# Patient Record
Sex: MALE | Race: WHITE | NOT HISPANIC OR LATINO | Employment: FULL TIME | URBAN - METROPOLITAN AREA
[De-identification: names, ages, dates, MRNs, and addresses within clinical notes are randomized per-mention and may not be internally consistent; named-entity substitution may affect disease eponyms.]

---

## 2018-11-16 ENCOUNTER — HOSPITAL ENCOUNTER (EMERGENCY)
Facility: HOSPITAL | Age: 28
Discharge: LEFT BEFORE DISCHARGE | End: 2018-11-17
Attending: EMERGENCY MEDICINE | Admitting: EMERGENCY MEDICINE

## 2018-11-16 VITALS
RESPIRATION RATE: 16 BRPM | HEIGHT: 68 IN | HEART RATE: 80 BPM | BODY MASS INDEX: 19.7 KG/M2 | DIASTOLIC BLOOD PRESSURE: 89 MMHG | TEMPERATURE: 97.5 F | WEIGHT: 130 LBS | SYSTOLIC BLOOD PRESSURE: 134 MMHG | OXYGEN SATURATION: 100 %

## 2018-11-16 DIAGNOSIS — F10.920 ACUTE ALCOHOLIC INTOXICATION WITHOUT COMPLICATION (HCC): Primary | ICD-10-CM

## 2018-11-16 DIAGNOSIS — S80.211A ABRASION OF RIGHT KNEE, INITIAL ENCOUNTER: ICD-10-CM

## 2018-11-16 LAB
ETHANOL BLD-MCNC: 434 MG/DL (ref 0–10)
ETHANOL UR QL: 0.43 %

## 2018-11-16 PROCEDURE — 80307 DRUG TEST PRSMV CHEM ANLYZR: CPT | Performed by: PHYSICIAN ASSISTANT

## 2018-11-16 PROCEDURE — 99283 EMERGENCY DEPT VISIT LOW MDM: CPT

## 2018-11-17 NOTE — ED PROVIDER NOTES
EMERGENCY DEPARTMENT ENCOUNTER    CHIEF COMPLAINT  Chief Complaint: EtOH intoxication  History given by: patient  History limited by: none  Room Number: 22/22  PMD: Provider, No Known      HPI:  Pt is a 28 y.o. male who presents from EMS complaining of EtOH intoxication that occurred PTA. Pt states that he left work and went to the [facility] and had about 10 shots of bourbon. Pt states he began to walk himself home and fell over. Pt does complain of right knee pain following the fall. Pt states EMS came after he fell. Per EMS, pt was given Thiamine and Zofran. Pt is unaware of his tdap status and denies any other drug use at this time.     Duration: Just PTA  Onset: gradual  Timing: constant  Location: NA  Radiation: NA  Quality: NA  Intensity/Severity: moderate  Progression: unchanged  Associated Symptoms: right knee pain  Aggravating Factors: none  Alleviating Factors: none  Previous Episodes: Pt has hx of EtOH intoxication.   Treatment before arrival: EMS gave pt Thiamine and Zofran PTA.     PAST MEDICAL HISTORY  Active Ambulatory Problems     Diagnosis Date Noted   • No Active Ambulatory Problems     Resolved Ambulatory Problems     Diagnosis Date Noted   • No Resolved Ambulatory Problems     No Additional Past Medical History       PAST SURGICAL HISTORY  History reviewed. No pertinent surgical history.    FAMILY HISTORY  History reviewed. No pertinent family history.    SOCIAL HISTORY  Social History     Socioeconomic History   • Marital status:      Spouse name: Not on file   • Number of children: Not on file   • Years of education: Not on file   • Highest education level: Not on file   Social Needs   • Financial resource strain: Not on file   • Food insecurity - worry: Not on file   • Food insecurity - inability: Not on file   • Transportation needs - medical: Not on file   • Transportation needs - non-medical: Not on file   Occupational History   • Not on file   Tobacco Use   • Smoking status:  Current Every Day Smoker   • Smokeless tobacco: Never Used   Substance and Sexual Activity   • Alcohol use: Yes   • Drug use: No   • Sexual activity: Defer   Other Topics Concern   • Not on file   Social History Narrative   • Not on file       ALLERGIES  Patient has no known allergies.    REVIEW OF SYSTEMS  Review of Systems   Constitutional: Negative for activity change, appetite change and fever.        (+) EtOH intoxication   HENT: Negative for congestion and sore throat.    Eyes: Negative.    Respiratory: Negative for cough and shortness of breath.    Cardiovascular: Negative for chest pain and leg swelling.   Gastrointestinal: Negative for abdominal pain, diarrhea and vomiting.   Endocrine: Negative.    Genitourinary: Negative for decreased urine volume and dysuria.   Musculoskeletal: Positive for arthralgias (right knee). Negative for neck pain.   Skin: Negative for rash and wound.   Allergic/Immunologic: Negative.    Neurological: Negative for weakness, numbness and headaches.   Hematological: Negative.    Psychiatric/Behavioral: Negative.    All other systems reviewed and are negative.      PHYSICAL EXAM  ED Triage Vitals [11/16/18 2139]   Temp Heart Rate Resp BP SpO2   97.5 °F (36.4 °C) 80 16 134/89 100 %      Temp src Heart Rate Source Patient Position BP Location FiO2 (%)   Tympanic Monitor -- -- --       Physical Exam   Constitutional: He is oriented to person, place, and time. No distress.   Pt smells of alcohol and appears to be intoxicated.    HENT:   Head: Normocephalic and atraumatic.   Eyes: EOM are normal. Pupils are equal, round, and reactive to light.   Neck: Normal range of motion. Neck supple.   Cardiovascular: Normal rate, regular rhythm and normal heart sounds.   Pulmonary/Chest: Effort normal and breath sounds normal. No respiratory distress.   Abdominal: Soft. There is no tenderness. There is no rebound and no guarding.   Musculoskeletal: Normal range of motion. He exhibits no edema.    Neurological: He is alert and oriented to person, place, and time. He has normal sensation and normal strength.   Skin: Skin is warm and dry. Abrasion (right knee) noted.   Psychiatric: Mood and affect normal.   Nursing note and vitals reviewed.      LAB RESULTS  Lab Results (last 24 hours)     Procedure Component Value Units Date/Time    Ethanol [[id number]]  (Abnormal) Collected:  11/16/18 2210    Specimen:  Blood Updated:  11/16/18 2238     Ethanol 434 mg/dL      Ethanol % 0.434 %           I ordered the above labs and reviewed the results      PROCEDURES  Procedures      PROGRESS AND CONSULTS     2205-Ordered Ethanol for further evaluation and tdap for infection.     2300-Pt is ambulating successfully without assistance.     2341-Discussed pt case with  (ED Physician) who agrees with the plan of care.     0002-Pt is in NAD and comfortably sleeping.     0123-Patient was seen by a healthcare provider and left AMA. Pt was counseled on risks of leaving intoxicated and verbalized understanding of risks.       MEDICAL DECISION MAKING  Results were reviewed/discussed with the patient and they were also made aware of online access. Pt also made aware that some labs, such as cultures, will not be resulted during ER visit and follow up with PMD is necessary.     MDM       DIAGNOSIS  Final diagnoses:   Acute alcoholic intoxication without complication (CMS/HCC)   Abrasion of right knee, initial encounter       DISPOSITION  Pt left AMA    Latest Documented Vital Signs:  As of 2:54 AM  BP- 134/89 HR- 80 Temp- 97.5 °F (36.4 °C) (Tympanic) O2 sat- 100%    --  Documentation assistance provided by ambreen Villanueva for Elbert Ramey (PA).  Information recorded by the scribe was done at my direction and has been verified and validated by me.               Karley Villanueva  11/17/18 0223       Elbert Ramey PA  11/17/18 0255

## 2018-11-17 NOTE — ED NOTES
Patient states that he accidentally pulled out his IV. When I entered the room patient was bleeding from his right ac, patient was resting comfortably and site was cleaned and taped. Patient vital signs are stable.          Risa Araya  11/16/18 5335

## 2018-11-17 NOTE — ED TRIAGE NOTES
To ER via EMS.  Pt was found intoxicated on side of the road.  Pt states he has been drinking beer.  Thiamine and Zofran given PTA.

## 2018-11-17 NOTE — ED NOTES
Pt reports that his glasses are missing as well as his ID, and bank card.  [facility] returned call and reported that he did not have his glasses and he had his ID in his lap that he put in his wallet.     Ana Koroma RN  11/16/18 5109       Ana Koroma RN  11/16/18 7041

## 2018-11-17 NOTE — ED NOTES
Patient walked out the front door of ER.  [facility] was called to alert them that the patient had left and was intoxicated.     Ana Koroma RN  11/17/18 0131

## 2018-11-17 NOTE — ED NOTES
"Patient states that he was drinking bourbon at the [place] tonight. Was walking home and he fell over while walking home. Ambulance was called. Ambulance gave thiamine and a liter of 1000 normal saline.      Urszula Yoo, RN  11/16/18 2532    "

## 2021-10-14 ENCOUNTER — APPOINTMENT (OUTPATIENT)
Dept: GENERAL RADIOLOGY | Facility: HOSPITAL | Age: 31
End: 2021-10-14

## 2021-10-14 ENCOUNTER — HOSPITAL ENCOUNTER (EMERGENCY)
Facility: HOSPITAL | Age: 31
Discharge: HOME | End: 2021-10-15
Attending: EMERGENCY MEDICINE | Admitting: EMERGENCY MEDICINE

## 2021-10-14 DIAGNOSIS — F41.9 ANXIETY: Primary | ICD-10-CM

## 2021-10-14 DIAGNOSIS — F10.10 ALCOHOL ABUSE: ICD-10-CM

## 2021-10-14 LAB
ALBUMIN SERPL-MCNC: 4.9 G/DL (ref 3.5–5.2)
ALBUMIN/GLOB SERPL: 1.8 G/DL
ALP SERPL-CCNC: 79 U/L (ref 39–117)
ALT SERPL W P-5'-P-CCNC: 55 U/L (ref 1–41)
AMPHET+METHAMPHET UR QL: NEGATIVE
ANION GAP SERPL CALCULATED.3IONS-SCNC: 16.2 MMOL/L (ref 5–15)
AST SERPL-CCNC: 124 U/L (ref 1–40)
BARBITURATES UR QL SCN: NEGATIVE
BASOPHILS # BLD AUTO: 0.05 10*3/MM3 (ref 0–0.2)
BASOPHILS NFR BLD AUTO: 0.6 % (ref 0–1.5)
BENZODIAZ UR QL SCN: NEGATIVE
BILIRUB SERPL-MCNC: 1.1 MG/DL (ref 0–1.2)
BUN SERPL-MCNC: 10 MG/DL (ref 6–20)
BUN/CREAT SERPL: 14.5 (ref 7–25)
CALCIUM SPEC-SCNC: 10.1 MG/DL (ref 8.6–10.5)
CANNABINOIDS SERPL QL: NEGATIVE
CHLORIDE SERPL-SCNC: 97 MMOL/L (ref 98–107)
CO2 SERPL-SCNC: 23.8 MMOL/L (ref 22–29)
COCAINE UR QL: NEGATIVE
CREAT SERPL-MCNC: 0.69 MG/DL (ref 0.76–1.27)
DEPRECATED RDW RBC AUTO: 48.8 FL (ref 37–54)
EOSINOPHIL # BLD AUTO: 0.06 10*3/MM3 (ref 0–0.4)
EOSINOPHIL NFR BLD AUTO: 0.7 % (ref 0.3–6.2)
ERYTHROCYTE [DISTWIDTH] IN BLOOD BY AUTOMATED COUNT: 13.6 % (ref 12.3–15.4)
ETHANOL BLD-MCNC: <10 MG/DL (ref 0–10)
ETHANOL UR QL: <0.01 %
GFR SERPL CREATININE-BSD FRML MDRD: 133 ML/MIN/1.73
GLOBULIN UR ELPH-MCNC: 2.8 GM/DL
GLUCOSE SERPL-MCNC: 88 MG/DL (ref 65–99)
HCT VFR BLD AUTO: 42.7 % (ref 37.5–51)
HGB BLD-MCNC: 15.3 G/DL (ref 13–17.7)
IMM GRANULOCYTES # BLD AUTO: 0.05 10*3/MM3 (ref 0–0.05)
IMM GRANULOCYTES NFR BLD AUTO: 0.6 % (ref 0–0.5)
LYMPHOCYTES # BLD AUTO: 0.37 10*3/MM3 (ref 0.7–3.1)
LYMPHOCYTES NFR BLD AUTO: 4.1 % (ref 19.6–45.3)
MCH RBC QN AUTO: 35.1 PG (ref 26.6–33)
MCHC RBC AUTO-ENTMCNC: 35.8 G/DL (ref 31.5–35.7)
MCV RBC AUTO: 97.9 FL (ref 79–97)
METHADONE UR QL SCN: NEGATIVE
MONOCYTES # BLD AUTO: 0.51 10*3/MM3 (ref 0.1–0.9)
MONOCYTES NFR BLD AUTO: 5.6 % (ref 5–12)
NEUTROPHILS NFR BLD AUTO: 7.99 10*3/MM3 (ref 1.7–7)
NEUTROPHILS NFR BLD AUTO: 88.4 % (ref 42.7–76)
NRBC BLD AUTO-RTO: 0 /100 WBC (ref 0–0.2)
OPIATES UR QL: NEGATIVE
OXYCODONE UR QL SCN: NEGATIVE
PLATELET # BLD AUTO: 63 10*3/MM3 (ref 140–450)
PMV BLD AUTO: 9.2 FL (ref 6–12)
POTASSIUM SERPL-SCNC: 3.7 MMOL/L (ref 3.5–5.2)
PROT SERPL-MCNC: 7.7 G/DL (ref 6–8.5)
RBC # BLD AUTO: 4.36 10*6/MM3 (ref 4.14–5.8)
SODIUM SERPL-SCNC: 137 MMOL/L (ref 136–145)
TROPONIN T SERPL-MCNC: <0.01 NG/ML (ref 0–0.03)
TROPONIN T SERPL-MCNC: <0.01 NG/ML (ref 0–0.03)
WBC # BLD AUTO: 9.03 10*3/MM3 (ref 3.4–10.8)

## 2021-10-14 PROCEDURE — 85025 COMPLETE CBC W/AUTO DIFF WBC: CPT | Performed by: EMERGENCY MEDICINE

## 2021-10-14 PROCEDURE — 93010 ELECTROCARDIOGRAM REPORT: CPT | Performed by: INTERNAL MEDICINE

## 2021-10-14 PROCEDURE — 96376 TX/PRO/DX INJ SAME DRUG ADON: CPT

## 2021-10-14 PROCEDURE — 80307 DRUG TEST PRSMV CHEM ANLYZR: CPT | Performed by: EMERGENCY MEDICINE

## 2021-10-14 PROCEDURE — 71045 X-RAY EXAM CHEST 1 VIEW: CPT

## 2021-10-14 PROCEDURE — 99285 EMERGENCY DEPT VISIT HI MDM: CPT

## 2021-10-14 PROCEDURE — 82077 ASSAY SPEC XCP UR&BREATH IA: CPT | Performed by: EMERGENCY MEDICINE

## 2021-10-14 PROCEDURE — 84484 ASSAY OF TROPONIN QUANT: CPT | Performed by: EMERGENCY MEDICINE

## 2021-10-14 PROCEDURE — 93005 ELECTROCARDIOGRAM TRACING: CPT | Performed by: EMERGENCY MEDICINE

## 2021-10-14 PROCEDURE — 25010000002 LORAZEPAM PER 2 MG: Performed by: EMERGENCY MEDICINE

## 2021-10-14 PROCEDURE — 96374 THER/PROPH/DIAG INJ IV PUSH: CPT

## 2021-10-14 PROCEDURE — 80053 COMPREHEN METABOLIC PANEL: CPT | Performed by: EMERGENCY MEDICINE

## 2021-10-14 RX ORDER — LORAZEPAM 2 MG/ML
1 INJECTION INTRAMUSCULAR ONCE
Status: COMPLETED | OUTPATIENT
Start: 2021-10-14 | End: 2021-10-14

## 2021-10-14 RX ORDER — SODIUM CHLORIDE 0.9 % (FLUSH) 0.9 %
10 SYRINGE (ML) INJECTION AS NEEDED
Status: DISCONTINUED | OUTPATIENT
Start: 2021-10-14 | End: 2021-10-15 | Stop reason: HOSPADM

## 2021-10-14 RX ADMIN — LORAZEPAM 1 MG: 2 INJECTION INTRAMUSCULAR; INTRAVENOUS at 20:11

## 2021-10-14 RX ADMIN — SODIUM CHLORIDE 1000 ML: 9 INJECTION, SOLUTION INTRAVENOUS at 20:11

## 2021-10-14 RX ADMIN — LORAZEPAM 1 MG: 2 INJECTION INTRAMUSCULAR; INTRAVENOUS at 23:09

## 2021-10-14 NOTE — ED TRIAGE NOTES
"Pt comes to ER from work for anxiety and tremors. Pt called EMS and states he was having \"seizures from alcohol withdrawal\". Pt usually drinks approx 11 beers a day and states he has been \"cutting back\" and has about 12 beers from 2200 last night to approx 1200 today. EMS reports no seizure like activity with them. Pt and RN wearing mask upon triage.     "

## 2021-10-15 VITALS
DIASTOLIC BLOOD PRESSURE: 83 MMHG | TEMPERATURE: 98.4 F | BODY MASS INDEX: 19.7 KG/M2 | WEIGHT: 130 LBS | HEART RATE: 92 BPM | RESPIRATION RATE: 16 BRPM | HEIGHT: 68 IN | OXYGEN SATURATION: 94 % | SYSTOLIC BLOOD PRESSURE: 134 MMHG

## 2021-10-15 LAB — QT INTERVAL: 352 MS

## 2021-10-15 PROCEDURE — 90791 PSYCH DIAGNOSTIC EVALUATION: CPT

## 2021-10-15 NOTE — CONSULTS
"30 yo white male evaluated in ED (Room#20) accompanied by his disabled mother. Patient lives with his mother. Patient's mother states he takes care of her due to her inability \"to walk\". Patient was at work ('[employer] and coworkers called EMS due to patient's behavior. Patient states he had a panic attack and was dehydrated.     Patient is an alcoholic and last drink was yesterday morning per his report. States he drinks liquor and beer daily. Patient's CIWA 21 on arrival to ED. Ativan and IVF's given.       Patient appears oriented times 4. Alert. States his longest period of sobriety was 2 years \"when I replaced it with another addiction\". States he played Poker and \"was very good at it and won a lot of money\".     Patient has history of previous ED encounters at [facility] for intoxication and has been advised to stop drinking alcohol and referred to AA.     Patient states \"AA is not for me; I have mixed emotions about it\". States substance use disorder treatment is \"the blind leading the blind; stupid people trying to tell me what to do\". Patient is not motivated for [facility] treatment at this time. Patient and his mother both in denial about patient experiencing alcohol withdrawal. Patient states \"I have poweraid at home that I can drink\" when I talked about him being dehydrated.     Patient not interested in [facility] or [facility].     No SI. No history of self harm or suicide attempts. No HI. No psychosis.     Patient's mother on her cell phone and states \"i'm trying to get him insurance, you know passport\".     Patient declines [facility] referrals.         "

## 2021-10-15 NOTE — ED PROVIDER NOTES
EMERGENCY DEPARTMENT ENCOUNTER    Room Number:  20/20  Date of encounter:  10/15/2021  PCP: Provider, No Known  Historian: Patient      HPI:  Chief Complaint: Anxiety, trembling  A complete HPI/ROS/PMH/PSH/SH/FH are unobtainable due to: None    Context: Carlos Peres is a 31 y.o. male who presents to the ED c/o anxiety and trembling that began at work.  Denies chest pain or shortness of breath.  States he has had similar episodes in the past.  States has been told he had panic attacks in the past.  Denies nausea vomiting.  Patient is also a heavy consumer of alcohol has had withdrawal in the past.      PAST MEDICAL HISTORY  Active Ambulatory Problems     Diagnosis Date Noted   • No Active Ambulatory Problems     Resolved Ambulatory Problems     Diagnosis Date Noted   • No Resolved Ambulatory Problems     No Additional Past Medical History         PAST SURGICAL HISTORY  History reviewed. No pertinent surgical history.      FAMILY HISTORY  History reviewed. No pertinent family history.      SOCIAL HISTORY  Social History     Socioeconomic History   • Marital status: Single   Tobacco Use   • Smoking status: Current Every Day Smoker   • Smokeless tobacco: Never Used   Substance and Sexual Activity   • Alcohol use: Yes     Alcohol/week: 20.0 standard drinks     Types: 5 Shots of liquor, 15 Cans of beer per week   • Drug use: Not Currently     Comment: Occasionally uses heroine, meth, cocaine, and smokes marijuana    • Sexual activity: Defer         ALLERGIES  Patient has no known allergies.        REVIEW OF SYSTEMS  Review of Systems     All systems reviewed and negative except for those discussed in HPI.       PHYSICAL EXAM    I have reviewed the triage vital signs and nursing notes.    ED Triage Vitals [10/14/21 1829]   Temp Heart Rate Resp BP SpO2   98.4 °F (36.9 °C) 112 18 (!) 172/111 100 %      Temp src Heart Rate Source Patient Position BP Location FiO2 (%)   -- -- -- -- --       Physical Exam  GENERAL:  Anxious  HENT: nares patent  EYES: no scleral icterus  CV: regular rhythm, regular rate  RESPIRATORY: normal effort  ABDOMEN: soft  MUSCULOSKELETAL: no deformity  NEURO: alert, moves all extremities, follows commands  SKIN: warm, dry        LAB RESULTS  Recent Results (from the past 24 hour(s))   Comprehensive Metabolic Panel    Collection Time: 10/14/21  8:04 PM    Specimen: Blood   Result Value Ref Range    Glucose 88 65 - 99 mg/dL    BUN 10 6 - 20 mg/dL    Creatinine 0.69 (L) 0.76 - 1.27 mg/dL    Sodium 137 136 - 145 mmol/L    Potassium 3.7 3.5 - 5.2 mmol/L    Chloride 97 (L) 98 - 107 mmol/L    CO2 23.8 22.0 - 29.0 mmol/L    Calcium 10.1 8.6 - 10.5 mg/dL    Total Protein 7.7 6.0 - 8.5 g/dL    Albumin 4.90 3.50 - 5.20 g/dL    ALT (SGPT) 55 (H) 1 - 41 U/L    AST (SGOT) 124 (H) 1 - 40 U/L    Alkaline Phosphatase 79 39 - 117 U/L    Total Bilirubin 1.1 0.0 - 1.2 mg/dL    eGFR Non African Amer 133 >60 mL/min/1.73    Globulin 2.8 gm/dL    A/G Ratio 1.8 g/dL    BUN/Creatinine Ratio 14.5 7.0 - 25.0    Anion Gap 16.2 (H) 5.0 - 15.0 mmol/L   Ethanol    Collection Time: 10/14/21  8:04 PM    Specimen: Blood   Result Value Ref Range    Ethanol <10 0 - 10 mg/dL    Ethanol % <0.010 %   Troponin    Collection Time: 10/14/21  8:04 PM    Specimen: Blood   Result Value Ref Range    Troponin T <0.010 0.000 - 0.030 ng/mL   CBC Auto Differential    Collection Time: 10/14/21  8:04 PM    Specimen: Blood   Result Value Ref Range    WBC 9.03 3.40 - 10.80 10*3/mm3    RBC 4.36 4.14 - 5.80 10*6/mm3    Hemoglobin 15.3 13.0 - 17.7 g/dL    Hematocrit 42.7 37.5 - 51.0 %    MCV 97.9 (H) 79.0 - 97.0 fL    MCH 35.1 (H) 26.6 - 33.0 pg    MCHC 35.8 (H) 31.5 - 35.7 g/dL    RDW 13.6 12.3 - 15.4 %    RDW-SD 48.8 37.0 - 54.0 fl    MPV 9.2 6.0 - 12.0 fL    Platelets 63 (L) 140 - 450 10*3/mm3    Neutrophil % 88.4 (H) 42.7 - 76.0 %    Lymphocyte % 4.1 (L) 19.6 - 45.3 %    Monocyte % 5.6 5.0 - 12.0 %    Eosinophil % 0.7 0.3 - 6.2 %    Basophil % 0.6 0.0 - 1.5  %    Immature Grans % 0.6 (H) 0.0 - 0.5 %    Neutrophils, Absolute 7.99 (H) 1.70 - 7.00 10*3/mm3    Lymphocytes, Absolute 0.37 (L) 0.70 - 3.10 10*3/mm3    Monocytes, Absolute 0.51 0.10 - 0.90 10*3/mm3    Eosinophils, Absolute 0.06 0.00 - 0.40 10*3/mm3    Basophils, Absolute 0.05 0.00 - 0.20 10*3/mm3    Immature Grans, Absolute 0.05 0.00 - 0.05 10*3/mm3    nRBC 0.0 0.0 - 0.2 /100 WBC   ECG 12 Lead    Collection Time: 10/14/21 10:21 PM   Result Value Ref Range    QT Interval 352 ms   Urine Drug Screen - Urine, Clean Catch    Collection Time: 10/14/21 11:04 PM    Specimen: Urine, Clean Catch   Result Value Ref Range    Amphet/Methamphet, Screen Negative Negative    Barbiturates Screen, Urine Negative Negative    Benzodiazepine Screen, Urine Negative Negative    Cocaine Screen, Urine Negative Negative    Opiate Screen Negative Negative    THC, Screen, Urine Negative Negative    Methadone Screen, Urine Negative Negative    Oxycodone Screen, Urine Negative Negative   Troponin    Collection Time: 10/14/21 11:04 PM    Specimen: Blood   Result Value Ref Range    Troponin T <0.010 0.000 - 0.030 ng/mL       Ordered the above labs and independently reviewed the results.        RADIOLOGY  XR Chest 1 View    Result Date: 10/14/2021  EMERGENCY PORTABLE VIEW OF THE CHEST 10/14/2021  CLINICAL HISTORY: Shortness of breath, anxiety, tremors.  COMPARISON: There are no prior chest x-rays for comparison.  FINDINGS: The cardiomediastinal silhouette and the pulmonary vasculature are within normal limits. The lungs are clear. The costophrenic angles are sharp.      1. No active disease is seen in the chest..        I ordered the above noted radiological studies. Reviewed by me and discussed with radiologist.  See dictation for official radiology interpretation.      PROCEDURES    Procedures      MEDICATIONS GIVEN IN ER    Medications   sodium chloride 0.9 % flush 10 mL (has no administration in time range)   sodium chloride 0.9 % bolus  1,000 mL (0 mL Intravenous Stopped 10/14/21 2200)   LORazepam (ATIVAN) injection 1 mg (1 mg Intravenous Given 10/14/21 2011)   LORazepam (ATIVAN) injection 1 mg (1 mg Intravenous Given 10/14/21 2309)         PROGRESS, DATA ANALYSIS, CONSULTS, AND MEDICAL DECISION MAKING    All labs have been independently reviewed by me.  All radiology studies have been reviewed by me and discussed with radiologist dictating the report.   EKG's independently viewed and interpreted by me.  Discussion below represents my analysis of pertinent findings related to patient's condition, differential diagnosis, treatment plan and final disposition.        ED Course as of 10/15/21 0111   Thu Oct 14, 2021   2224 EKG          EKG time: 2221  Rhythm/Rate: Sinus tachycardia rate 100  P waves and PA: Normal  QRS, axis: Narrow regular  ST and T waves: Early refill planning    Interpreted Contemporaneously by me, independently viewed  No significant changed compared to prior EKG from 3/11/2015   [TJ]   Fri Oct 15, 2021   0031 Pt is medically cleared.  Wants to talk to [facility] about resources for alcoholism. [TJ]   0038 Patient seen and cleared by [facility].  Will receive outpatient resources. [TJ]      ED Course User Index  [TJ] Carlos Ortega MD           PPE: The patient wore a surgical mask throughout the entire patient encounter. I wore an N95.    AS OF 01:11 EDT VITALS:    BP - 134/83  HR - 92  TEMP - 98.4 °F (36.9 °C) (Oral)  O2 SATS - 94%        DIAGNOSIS  Final diagnoses:   Anxiety   Alcohol abuse         DISPOSITION  Discharge           Carlos Ortega MD  10/15/21 0111

## 2022-01-27 ENCOUNTER — HOSPITAL ENCOUNTER (EMERGENCY)
Facility: HOSPITAL | Age: 32
Discharge: HOME | End: 2022-01-27
Attending: EMERGENCY MEDICINE | Admitting: EMERGENCY MEDICINE

## 2022-01-27 VITALS
HEIGHT: 68 IN | TEMPERATURE: 98.6 F | SYSTOLIC BLOOD PRESSURE: 112 MMHG | WEIGHT: 130 LBS | HEART RATE: 96 BPM | DIASTOLIC BLOOD PRESSURE: 80 MMHG | BODY MASS INDEX: 19.7 KG/M2 | OXYGEN SATURATION: 100 % | RESPIRATION RATE: 16 BRPM

## 2022-01-27 DIAGNOSIS — R79.89 ELEVATED LFTS: ICD-10-CM

## 2022-01-27 DIAGNOSIS — F10.10 CHRONIC ALCOHOL ABUSE: Primary | ICD-10-CM

## 2022-01-27 DIAGNOSIS — R53.83 FATIGUE, UNSPECIFIED TYPE: ICD-10-CM

## 2022-01-27 LAB
ALBUMIN SERPL-MCNC: 3.8 G/DL (ref 3.5–5.2)
ALBUMIN/GLOB SERPL: 1.2 G/DL
ALP SERPL-CCNC: 210 U/L (ref 39–117)
ALT SERPL W P-5'-P-CCNC: 157 U/L (ref 1–41)
ANION GAP SERPL CALCULATED.3IONS-SCNC: 15.1 MMOL/L (ref 5–15)
AST SERPL-CCNC: 259 U/L (ref 1–40)
BILIRUB SERPL-MCNC: 2.5 MG/DL (ref 0–1.2)
BILIRUB UR QL STRIP: ABNORMAL
BUN SERPL-MCNC: 11 MG/DL (ref 6–20)
BUN/CREAT SERPL: 14.5 (ref 7–25)
CALCIUM SPEC-SCNC: 8.4 MG/DL (ref 8.6–10.5)
CHLORIDE SERPL-SCNC: 97 MMOL/L (ref 98–107)
CLARITY UR: CLEAR
CO2 SERPL-SCNC: 19.9 MMOL/L (ref 22–29)
COLOR UR: ABNORMAL
CREAT SERPL-MCNC: 0.76 MG/DL (ref 0.76–1.27)
DEPRECATED RDW RBC AUTO: 52.8 FL (ref 37–54)
EOSINOPHIL # BLD MANUAL: 0.2 10*3/MM3 (ref 0–0.4)
EOSINOPHIL NFR BLD MANUAL: 3 % (ref 0.3–6.2)
ERYTHROCYTE [DISTWIDTH] IN BLOOD BY AUTOMATED COUNT: 14.3 % (ref 12.3–15.4)
ETHANOL BLD-MCNC: 400 MG/DL (ref 0–10)
ETHANOL UR QL: 0.4 %
GFR SERPL CREATININE-BSD FRML MDRD: 119 ML/MIN/1.73
GLOBULIN UR ELPH-MCNC: 3.3 GM/DL
GLUCOSE SERPL-MCNC: 175 MG/DL (ref 65–99)
GLUCOSE UR STRIP-MCNC: NEGATIVE MG/DL
HCT VFR BLD AUTO: 41.6 % (ref 37.5–51)
HGB BLD-MCNC: 15.9 G/DL (ref 13–17.7)
HGB UR QL STRIP.AUTO: NEGATIVE
HOLD SPECIMEN: NORMAL
HOLD SPECIMEN: NORMAL
KETONES UR QL STRIP: NEGATIVE
LEUKOCYTE ESTERASE UR QL STRIP.AUTO: NEGATIVE
LYMPHOCYTES # BLD MANUAL: 1.82 10*3/MM3 (ref 0.7–3.1)
LYMPHOCYTES NFR BLD MANUAL: 2 % (ref 5–12)
MAGNESIUM SERPL-MCNC: 2.4 MG/DL (ref 1.6–2.6)
MCH RBC QN AUTO: 38.6 PG (ref 26.6–33)
MCHC RBC AUTO-ENTMCNC: 38.2 G/DL (ref 31.5–35.7)
MCV RBC AUTO: 101 FL (ref 79–97)
MONOCYTES # BLD: 0.14 10*3/MM3 (ref 0.1–0.9)
NEUTROPHILS # BLD AUTO: 4.59 10*3/MM3 (ref 1.7–7)
NEUTROPHILS NFR BLD MANUAL: 68 % (ref 42.7–76)
NITRITE UR QL STRIP: NEGATIVE
PH UR STRIP.AUTO: 5.5 [PH] (ref 5–8)
PLAT MORPH BLD: NORMAL
PLATELET # BLD AUTO: 82 10*3/MM3 (ref 140–450)
PMV BLD AUTO: 10.9 FL (ref 6–12)
POTASSIUM SERPL-SCNC: 3.7 MMOL/L (ref 3.5–5.2)
PROT SERPL-MCNC: 7.1 G/DL (ref 6–8.5)
PROT UR QL STRIP: ABNORMAL
QT INTERVAL: 391 MS
RBC # BLD AUTO: 4.12 10*6/MM3 (ref 4.14–5.8)
RBC MORPH BLD: NORMAL
SODIUM SERPL-SCNC: 132 MMOL/L (ref 136–145)
SP GR UR STRIP: 1.02 (ref 1–1.03)
UROBILINOGEN UR QL STRIP: ABNORMAL
VARIANT LYMPHS NFR BLD MANUAL: 27 % (ref 19.6–45.3)
WBC MORPH BLD: NORMAL
WBC NRBC COR # BLD: 6.75 10*3/MM3 (ref 3.4–10.8)
WHOLE BLOOD HOLD SPECIMEN: NORMAL
WHOLE BLOOD HOLD SPECIMEN: NORMAL

## 2022-01-27 PROCEDURE — 99284 EMERGENCY DEPT VISIT MOD MDM: CPT

## 2022-01-27 PROCEDURE — 93005 ELECTROCARDIOGRAM TRACING: CPT

## 2022-01-27 PROCEDURE — 82077 ASSAY SPEC XCP UR&BREATH IA: CPT | Performed by: EMERGENCY MEDICINE

## 2022-01-27 PROCEDURE — 85007 BL SMEAR W/DIFF WBC COUNT: CPT | Performed by: EMERGENCY MEDICINE

## 2022-01-27 PROCEDURE — 93005 ELECTROCARDIOGRAM TRACING: CPT | Performed by: EMERGENCY MEDICINE

## 2022-01-27 PROCEDURE — 36415 COLL VENOUS BLD VENIPUNCTURE: CPT

## 2022-01-27 PROCEDURE — 85025 COMPLETE CBC W/AUTO DIFF WBC: CPT | Performed by: EMERGENCY MEDICINE

## 2022-01-27 PROCEDURE — C9803 HOPD COVID-19 SPEC COLLECT: HCPCS | Performed by: EMERGENCY MEDICINE

## 2022-01-27 PROCEDURE — 80053 COMPREHEN METABOLIC PANEL: CPT | Performed by: EMERGENCY MEDICINE

## 2022-01-27 PROCEDURE — 81003 URINALYSIS AUTO W/O SCOPE: CPT | Performed by: EMERGENCY MEDICINE

## 2022-01-27 PROCEDURE — 83735 ASSAY OF MAGNESIUM: CPT | Performed by: EMERGENCY MEDICINE

## 2022-01-27 PROCEDURE — U0004 COV-19 TEST NON-CDC HGH THRU: HCPCS | Performed by: EMERGENCY MEDICINE

## 2022-01-27 PROCEDURE — 93010 ELECTROCARDIOGRAM REPORT: CPT | Performed by: INTERNAL MEDICINE

## 2022-01-27 RX ORDER — ONDANSETRON 4 MG/1
4 TABLET, ORALLY DISINTEGRATING ORAL EVERY 8 HOURS PRN
Qty: 15 TABLET | Refills: 0 | Status: SHIPPED | OUTPATIENT
Start: 2022-01-27 | End: 2022-02-01

## 2022-01-27 RX ORDER — SODIUM CHLORIDE 0.9 % (FLUSH) 0.9 %
10 SYRINGE (ML) INJECTION AS NEEDED
Status: DISCONTINUED | OUTPATIENT
Start: 2022-01-27 | End: 2022-01-27 | Stop reason: HOSPADM

## 2022-01-27 RX ADMIN — SODIUM CHLORIDE, POTASSIUM CHLORIDE, SODIUM LACTATE AND CALCIUM CHLORIDE 1000 ML: 600; 310; 30; 20 INJECTION, SOLUTION INTRAVENOUS at 16:46

## 2022-01-28 LAB — SARS-COV-2 ORF1AB RESP QL NAA+PROBE: NOT DETECTED

## 2024-04-30 ENCOUNTER — HOSPITAL ENCOUNTER (INPATIENT)
Facility: HOSPITAL | Age: 34
LOS: 5 days | Discharge: HOME | DRG: 897 | End: 2024-05-05
Attending: PSYCHIATRY & NEUROLOGY | Admitting: PSYCHIATRY & NEUROLOGY
Payer: COMMERCIAL

## 2024-04-30 ENCOUNTER — HOSPITAL ENCOUNTER (EMERGENCY)
Facility: HOSPITAL | Age: 34
Discharge: OTHER INSTITUTION | DRG: 897 | End: 2024-04-30
Attending: EMERGENCY MEDICINE
Payer: COMMERCIAL

## 2024-04-30 VITALS
RESPIRATION RATE: 17 BRPM | OXYGEN SATURATION: 97 % | SYSTOLIC BLOOD PRESSURE: 121 MMHG | DIASTOLIC BLOOD PRESSURE: 81 MMHG | TEMPERATURE: 98.4 F | HEIGHT: 68 IN | BODY MASS INDEX: 18.94 KG/M2 | WEIGHT: 125 LBS | HEART RATE: 69 BPM

## 2024-04-30 DIAGNOSIS — F10.10 ALCOHOL ABUSE: Primary | ICD-10-CM

## 2024-04-30 PROBLEM — F10.20 ETOH DEPENDENCE: Status: ACTIVE | Noted: 2024-04-30

## 2024-04-30 LAB
ALBUMIN SERPL-MCNC: 4.9 G/DL (ref 3.5–5.2)
ALBUMIN/GLOB SERPL: 1.4 G/DL
ALP SERPL-CCNC: 123 U/L (ref 39–117)
ALT SERPL W P-5'-P-CCNC: 24 U/L (ref 1–41)
AMPHET+METHAMPHET UR QL: NEGATIVE
AMPHETAMINES UR QL: NEGATIVE
ANION GAP SERPL CALCULATED.3IONS-SCNC: 19.6 MMOL/L (ref 5–15)
AST SERPL-CCNC: 29 U/L (ref 1–40)
BACTERIA UR QL AUTO: ABNORMAL /HPF
BARBITURATES UR QL SCN: NEGATIVE
BASOPHILS # BLD AUTO: 0.07 10*3/MM3 (ref 0–0.2)
BASOPHILS NFR BLD AUTO: 0.5 % (ref 0–1.5)
BENZODIAZ UR QL SCN: NEGATIVE
BILIRUB SERPL-MCNC: 1.5 MG/DL (ref 0–1.2)
BILIRUB UR QL STRIP: ABNORMAL
BUN SERPL-MCNC: 14 MG/DL (ref 6–20)
BUN/CREAT SERPL: 15.6 (ref 7–25)
BUPRENORPHINE SERPL-MCNC: NEGATIVE NG/ML
CALCIUM SPEC-SCNC: 10.1 MG/DL (ref 8.6–10.5)
CANNABINOIDS SERPL QL: NEGATIVE
CHLORIDE SERPL-SCNC: 94 MMOL/L (ref 98–107)
CLARITY UR: CLEAR
CO2 SERPL-SCNC: 24.4 MMOL/L (ref 22–29)
COCAINE UR QL: NEGATIVE
COLOR UR: ABNORMAL
CREAT SERPL-MCNC: 0.9 MG/DL (ref 0.76–1.27)
DEPRECATED RDW RBC AUTO: 47.2 FL (ref 37–54)
EGFRCR SERPLBLD CKD-EPI 2021: 114.9 ML/MIN/1.73
EOSINOPHIL # BLD AUTO: 0.01 10*3/MM3 (ref 0–0.4)
EOSINOPHIL NFR BLD AUTO: 0.1 % (ref 0.3–6.2)
ERYTHROCYTE [DISTWIDTH] IN BLOOD BY AUTOMATED COUNT: 13.5 % (ref 12.3–15.4)
ETHANOL BLD-MCNC: <10 MG/DL (ref 0–10)
ETHANOL UR QL: <0.01 %
FENTANYL UR-MCNC: NEGATIVE NG/ML
GLOBULIN UR ELPH-MCNC: 3.4 GM/DL
GLUCOSE SERPL-MCNC: 160 MG/DL (ref 65–99)
GLUCOSE UR STRIP-MCNC: NEGATIVE MG/DL
HAV IGM SERPL QL IA: NORMAL
HBV CORE IGM SERPL QL IA: NORMAL
HBV SURFACE AG SERPL QL IA: NORMAL
HCT VFR BLD AUTO: 49.4 % (ref 37.5–51)
HCV AB SER QL: NORMAL
HGB BLD-MCNC: 17.4 G/DL (ref 13–17.7)
HGB UR QL STRIP.AUTO: NEGATIVE
HYALINE CASTS UR QL AUTO: ABNORMAL /LPF
IMM GRANULOCYTES # BLD AUTO: 0.04 10*3/MM3 (ref 0–0.05)
IMM GRANULOCYTES NFR BLD AUTO: 0.3 % (ref 0–0.5)
KETONES UR QL STRIP: ABNORMAL
LEUKOCYTE ESTERASE UR QL STRIP.AUTO: ABNORMAL
LYMPHOCYTES # BLD AUTO: 0.85 10*3/MM3 (ref 0.7–3.1)
LYMPHOCYTES NFR BLD AUTO: 6.3 % (ref 19.6–45.3)
MAGNESIUM SERPL-MCNC: 1.8 MG/DL (ref 1.6–2.6)
MCH RBC QN AUTO: 33.2 PG (ref 26.6–33)
MCHC RBC AUTO-ENTMCNC: 35.2 G/DL (ref 31.5–35.7)
MCV RBC AUTO: 94.3 FL (ref 79–97)
METHADONE UR QL SCN: NEGATIVE
MONOCYTES # BLD AUTO: 1.11 10*3/MM3 (ref 0.1–0.9)
MONOCYTES NFR BLD AUTO: 8.2 % (ref 5–12)
NEUTROPHILS NFR BLD AUTO: 11.5 10*3/MM3 (ref 1.7–7)
NEUTROPHILS NFR BLD AUTO: 84.6 % (ref 42.7–76)
NITRITE UR QL STRIP: NEGATIVE
NRBC BLD AUTO-RTO: 0 /100 WBC (ref 0–0.2)
OPIATES UR QL: NEGATIVE
OXYCODONE UR QL SCN: NEGATIVE
PCP UR QL SCN: NEGATIVE
PH UR STRIP.AUTO: 7 [PH] (ref 5–8)
PLATELET # BLD AUTO: 265 10*3/MM3 (ref 140–450)
PMV BLD AUTO: 9 FL (ref 6–12)
POTASSIUM SERPL-SCNC: 3.8 MMOL/L (ref 3.5–5.2)
PROT SERPL-MCNC: 8.3 G/DL (ref 6–8.5)
PROT UR QL STRIP: ABNORMAL
RBC # BLD AUTO: 5.24 10*6/MM3 (ref 4.14–5.8)
RBC # UR STRIP: ABNORMAL /HPF
REF LAB TEST METHOD: ABNORMAL
SODIUM SERPL-SCNC: 138 MMOL/L (ref 136–145)
SP GR UR STRIP: 1.03 (ref 1–1.03)
SQUAMOUS #/AREA URNS HPF: ABNORMAL /HPF
TRICYCLICS UR QL SCN: NEGATIVE
UROBILINOGEN UR QL STRIP: ABNORMAL
WBC # UR STRIP: ABNORMAL /HPF
WBC NRBC COR # BLD AUTO: 13.58 10*3/MM3 (ref 3.4–10.8)

## 2024-04-30 PROCEDURE — 63710000001 ONDANSETRON ODT 4 MG TABLET DISPERSIBLE: Performed by: PSYCHIATRY & NEUROLOGY

## 2024-04-30 PROCEDURE — 93005 ELECTROCARDIOGRAM TRACING: CPT | Performed by: PSYCHIATRY & NEUROLOGY

## 2024-04-30 PROCEDURE — 80074 ACUTE HEPATITIS PANEL: CPT | Performed by: PSYCHIATRY & NEUROLOGY

## 2024-04-30 PROCEDURE — 80307 DRUG TEST PRSMV CHEM ANLYZR: CPT | Performed by: PHYSICIAN ASSISTANT

## 2024-04-30 PROCEDURE — 99285 EMERGENCY DEPT VISIT HI MDM: CPT

## 2024-04-30 PROCEDURE — 80053 COMPREHEN METABOLIC PANEL: CPT | Performed by: PHYSICIAN ASSISTANT

## 2024-04-30 PROCEDURE — 36415 COLL VENOUS BLD VENIPUNCTURE: CPT

## 2024-04-30 PROCEDURE — 85025 COMPLETE CBC W/AUTO DIFF WBC: CPT | Performed by: PHYSICIAN ASSISTANT

## 2024-04-30 PROCEDURE — 83735 ASSAY OF MAGNESIUM: CPT | Performed by: PHYSICIAN ASSISTANT

## 2024-04-30 PROCEDURE — 93010 ELECTROCARDIOGRAM REPORT: CPT | Performed by: INTERNAL MEDICINE

## 2024-04-30 PROCEDURE — 82077 ASSAY SPEC XCP UR&BREATH IA: CPT | Performed by: PHYSICIAN ASSISTANT

## 2024-04-30 PROCEDURE — 81001 URINALYSIS AUTO W/SCOPE: CPT | Performed by: PHYSICIAN ASSISTANT

## 2024-04-30 RX ORDER — LORAZEPAM 2 MG/1
2 TABLET ORAL EVERY 4 HOURS PRN
Status: ACTIVE | OUTPATIENT
Start: 2024-05-01 | End: 2024-05-02

## 2024-04-30 RX ORDER — LORAZEPAM 0.5 MG/1
0.5 TABLET ORAL
Status: COMPLETED | OUTPATIENT
Start: 2024-05-04 | End: 2024-05-04

## 2024-04-30 RX ORDER — BENZONATATE 100 MG/1
100 CAPSULE ORAL 3 TIMES DAILY PRN
Status: DISCONTINUED | OUTPATIENT
Start: 2024-04-30 | End: 2024-05-05 | Stop reason: HOSPADM

## 2024-04-30 RX ORDER — NALTREXONE HYDROCHLORIDE 50 MG/1
50 TABLET, FILM COATED ORAL DAILY
Status: DISCONTINUED | OUTPATIENT
Start: 2024-04-30 | End: 2024-05-05 | Stop reason: HOSPADM

## 2024-04-30 RX ORDER — LORAZEPAM 1 MG/1
1 TABLET ORAL
Status: COMPLETED | OUTPATIENT
Start: 2024-05-03 | End: 2024-05-03

## 2024-04-30 RX ORDER — LOPERAMIDE HYDROCHLORIDE 2 MG/1
2 CAPSULE ORAL
Status: DISCONTINUED | OUTPATIENT
Start: 2024-04-30 | End: 2024-05-05 | Stop reason: HOSPADM

## 2024-04-30 RX ORDER — IBUPROFEN 400 MG/1
400 TABLET ORAL EVERY 6 HOURS PRN
Status: DISCONTINUED | OUTPATIENT
Start: 2024-04-30 | End: 2024-05-05 | Stop reason: HOSPADM

## 2024-04-30 RX ORDER — LORAZEPAM 2 MG/1
2 TABLET ORAL
Status: DISPENSED | OUTPATIENT
Start: 2024-05-01 | End: 2024-05-02

## 2024-04-30 RX ORDER — FAMOTIDINE 20 MG/1
20 TABLET, FILM COATED ORAL 2 TIMES DAILY PRN
Status: DISCONTINUED | OUTPATIENT
Start: 2024-04-30 | End: 2024-05-05 | Stop reason: HOSPADM

## 2024-04-30 RX ORDER — HYDROXYZINE HYDROCHLORIDE 10 MG/1
10 TABLET, FILM COATED ORAL 3 TIMES DAILY PRN
COMMUNITY

## 2024-04-30 RX ORDER — ALUMINA, MAGNESIA, AND SIMETHICONE 2400; 2400; 240 MG/30ML; MG/30ML; MG/30ML
15 SUSPENSION ORAL EVERY 6 HOURS PRN
Status: DISCONTINUED | OUTPATIENT
Start: 2024-04-30 | End: 2024-05-05 | Stop reason: HOSPADM

## 2024-04-30 RX ORDER — MULTIVITAMIN WITH IRON
2 TABLET ORAL DAILY
Status: DISCONTINUED | OUTPATIENT
Start: 2024-04-30 | End: 2024-05-05 | Stop reason: HOSPADM

## 2024-04-30 RX ORDER — LORAZEPAM 1 MG/1
1 TABLET ORAL EVERY 4 HOURS PRN
Status: ACTIVE | OUTPATIENT
Start: 2024-05-03 | End: 2024-05-04

## 2024-04-30 RX ORDER — MULTIPLE VITAMINS W/ MINERALS TAB 9MG-400MCG
1 TAB ORAL DAILY
Status: DISCONTINUED | OUTPATIENT
Start: 2024-04-30 | End: 2024-05-05 | Stop reason: HOSPADM

## 2024-04-30 RX ORDER — ACETAMINOPHEN 325 MG/1
650 TABLET ORAL EVERY 6 HOURS PRN
Status: DISCONTINUED | OUTPATIENT
Start: 2024-04-30 | End: 2024-05-05 | Stop reason: HOSPADM

## 2024-04-30 RX ORDER — HYDROXYZINE 50 MG/1
50 TABLET, FILM COATED ORAL EVERY 6 HOURS PRN
Status: DISCONTINUED | OUTPATIENT
Start: 2024-04-30 | End: 2024-05-05 | Stop reason: HOSPADM

## 2024-04-30 RX ORDER — BENZTROPINE MESYLATE 1 MG/ML
1 INJECTION INTRAMUSCULAR; INTRAVENOUS ONCE AS NEEDED
Status: DISCONTINUED | OUTPATIENT
Start: 2024-04-30 | End: 2024-05-05 | Stop reason: HOSPADM

## 2024-04-30 RX ORDER — BENZTROPINE MESYLATE 1 MG/1
2 TABLET ORAL ONCE AS NEEDED
Status: DISCONTINUED | OUTPATIENT
Start: 2024-04-30 | End: 2024-05-05 | Stop reason: HOSPADM

## 2024-04-30 RX ORDER — LORAZEPAM 2 MG/1
2 TABLET ORAL
Status: DISPENSED | OUTPATIENT
Start: 2024-04-30 | End: 2024-05-01

## 2024-04-30 RX ORDER — ECHINACEA PURPUREA EXTRACT 125 MG
2 TABLET ORAL AS NEEDED
Status: DISCONTINUED | OUTPATIENT
Start: 2024-04-30 | End: 2024-05-05 | Stop reason: HOSPADM

## 2024-04-30 RX ORDER — TRAZODONE HYDROCHLORIDE 50 MG/1
50 TABLET ORAL NIGHTLY PRN
Status: DISCONTINUED | OUTPATIENT
Start: 2024-04-30 | End: 2024-05-05 | Stop reason: HOSPADM

## 2024-04-30 RX ORDER — NALTREXONE HYDROCHLORIDE 50 MG/1
50 TABLET, FILM COATED ORAL DAILY
COMMUNITY

## 2024-04-30 RX ORDER — HYDROXYZINE HYDROCHLORIDE 10 MG/1
10 TABLET, FILM COATED ORAL 3 TIMES DAILY PRN
Status: CANCELLED | OUTPATIENT
Start: 2024-04-30

## 2024-04-30 RX ORDER — LORAZEPAM 0.5 MG/1
0.5 TABLET ORAL EVERY 4 HOURS PRN
Status: ACTIVE | OUTPATIENT
Start: 2024-05-04 | End: 2024-05-05

## 2024-04-30 RX ORDER — ONDANSETRON 4 MG/1
4 TABLET, ORALLY DISINTEGRATING ORAL EVERY 6 HOURS PRN
Status: DISCONTINUED | OUTPATIENT
Start: 2024-04-30 | End: 2024-05-05 | Stop reason: HOSPADM

## 2024-04-30 RX ADMIN — Medication 100 MG: at 13:53

## 2024-04-30 RX ADMIN — NALTREXONE HYDROCHLORIDE 50 MG: 50 TABLET, FILM COATED ORAL at 13:53

## 2024-04-30 RX ADMIN — ONDANSETRON 4 MG: 4 TABLET, ORALLY DISINTEGRATING ORAL at 13:53

## 2024-04-30 RX ADMIN — ALUMINUM HYDROXIDE, MAGNESIUM HYDROXIDE, AND DIMETHICONE 15 ML: 400; 400; 40 SUSPENSION ORAL at 13:57

## 2024-04-30 RX ADMIN — Medication 1 TABLET: at 13:53

## 2024-04-30 RX ADMIN — Medication 2 TABLET: at 13:53

## 2024-04-30 RX ADMIN — IBUPROFEN 400 MG: 400 TABLET, FILM COATED ORAL at 13:53

## 2024-04-30 RX ADMIN — LORAZEPAM 2 MG: 2 TABLET ORAL at 16:39

## 2024-04-30 RX ADMIN — HYDROXYZINE HYDROCHLORIDE 50 MG: 50 TABLET, FILM COATED ORAL at 13:53

## 2024-04-30 NOTE — NURSING NOTE
Presented pt to Dr. Daniels and all abnormal labs. New order to admit patient to CD. SP4. Routine orders. Ativan detox protocol with comfort meds. Rbovx2.

## 2024-04-30 NOTE — NURSING NOTE
Patient was brought in by [facility] for alcohol detox. He reports drinking 10-15 shots of liquor a day for the past 15 years with his lats drink being 4/29/24. He denies any other substance use. Patient denies si, hi, and avh. He rates anxiety 3/10 and depression 2/10. He denies any cravings. Ciwa 11.

## 2024-04-30 NOTE — ED PROVIDER NOTES
Subjective   History of Present Illness  33-year-old male presents secondary to need for alcohol detox.  Patient states has been drinking excessively.  He denies any suicidal homicidal ideation.  He denies any past medical problems.  He presents by private vehicle.      Review of Systems   Constitutional: Negative.  Negative for fever.   HENT: Negative.     Respiratory: Negative.     Cardiovascular: Negative.  Negative for chest pain.   Gastrointestinal: Negative.  Negative for abdominal pain.   Endocrine: Negative.    Genitourinary: Negative.  Negative for dysuria.   Skin: Negative.    Neurological: Negative.    Psychiatric/Behavioral: Negative.  Negative for suicidal ideas.    All other systems reviewed and are negative.      Past Medical History:   Diagnosis Date    Alcoholism        No Known Allergies    History reviewed. No pertinent surgical history.    History reviewed. No pertinent family history.    Social History     Socioeconomic History    Marital status: Single   Tobacco Use    Smoking status: Every Day     Current packs/day: 1.00     Average packs/day: 1 pack/day for 14.0 years (14.0 ttl pk-yrs)     Types: Cigarettes     Start date: 4/30/2010   Substance and Sexual Activity    Alcohol use: Yes     Alcohol/week: 10.0 - 15.0 standard drinks of alcohol     Types: 10 - 15 Shots of liquor per week    Sexual activity: Yes     Partners: Female           Objective   Physical Exam  Vitals and nursing note reviewed.   Constitutional:       General: He is not in acute distress.     Appearance: He is well-developed. He is not diaphoretic.   HENT:      Head: Normocephalic and atraumatic.      Right Ear: External ear normal.      Left Ear: External ear normal.      Nose: Nose normal.   Eyes:      Conjunctiva/sclera: Conjunctivae normal.      Pupils: Pupils are equal, round, and reactive to light.   Neck:      Vascular: No JVD.      Trachea: No tracheal deviation.   Cardiovascular:      Rate and Rhythm: Normal rate  and regular rhythm.      Heart sounds: Normal heart sounds. No murmur heard.  Pulmonary:      Effort: Pulmonary effort is normal. No respiratory distress.      Breath sounds: Normal breath sounds. No wheezing.   Abdominal:      General: Bowel sounds are normal.      Palpations: Abdomen is soft.      Tenderness: There is no abdominal tenderness.   Musculoskeletal:         General: No deformity. Normal range of motion.      Cervical back: Normal range of motion and neck supple.   Skin:     General: Skin is warm and dry.      Coloration: Skin is not pale.      Findings: No erythema or rash.   Neurological:      Mental Status: He is alert and oriented to person, place, and time.      Cranial Nerves: No cranial nerve deficit.   Psychiatric:         Behavior: Behavior normal.         Thought Content: Thought content normal. Thought content does not include homicidal or suicidal ideation.         Procedures           ED Course                                 Results for orders placed or performed during the hospital encounter of 04/30/24   Comprehensive Metabolic Panel    Specimen: Blood   Result Value Ref Range    Glucose 160 (H) 65 - 99 mg/dL    BUN 14 6 - 20 mg/dL    Creatinine 0.90 0.76 - 1.27 mg/dL    Sodium 138 136 - 145 mmol/L    Potassium 3.8 3.5 - 5.2 mmol/L    Chloride 94 (L) 98 - 107 mmol/L    CO2 24.4 22.0 - 29.0 mmol/L    Calcium 10.1 8.6 - 10.5 mg/dL    Total Protein 8.3 6.0 - 8.5 g/dL    Albumin 4.9 3.5 - 5.2 g/dL    ALT (SGPT) 24 1 - 41 U/L    AST (SGOT) 29 1 - 40 U/L    Alkaline Phosphatase 123 (H) 39 - 117 U/L    Total Bilirubin 1.5 (H) 0.0 - 1.2 mg/dL    Globulin 3.4 gm/dL    A/G Ratio 1.4 g/dL    BUN/Creatinine Ratio 15.6 7.0 - 25.0    Anion Gap 19.6 (H) 5.0 - 15.0 mmol/L    eGFR 114.9 >60.0 mL/min/1.73   Urinalysis With Microscopic If Indicated (No Culture) - Urine, Clean Catch    Specimen: Urine, Clean Catch   Result Value Ref Range    Color, UA Dark Yellow (A) Yellow, Straw    Appearance, UA Clear  Clear    pH, UA 7.0 5.0 - 8.0    Specific Gravity, UA 1.028 1.005 - 1.030    Glucose, UA Negative Negative    Ketones, UA 15 mg/dL (1+) (A) Negative    Bilirubin, UA Small (1+) (A) Negative    Blood, UA Negative Negative    Protein,  mg/dL (2+) (A) Negative    Leuk Esterase, UA Trace (A) Negative    Nitrite, UA Negative Negative    Urobilinogen, UA 1.0 E.U./dL 0.2 - 1.0 E.U./dL   Ethanol    Specimen: Blood   Result Value Ref Range    Ethanol <10 0 - 10 mg/dL    Ethanol % <0.010 %   Urine Drug Screen - Urine, Clean Catch    Specimen: Urine, Clean Catch   Result Value Ref Range    THC, Screen, Urine Negative Negative    Phencyclidine (PCP), Urine Negative Negative    Cocaine Screen, Urine Negative Negative    Methamphetamine, Ur Negative Negative    Opiate Screen Negative Negative    Amphetamine Screen, Urine Negative Negative    Benzodiazepine Screen, Urine Negative Negative    Tricyclic Antidepressants Screen Negative Negative    Methadone Screen, Urine Negative Negative    Barbiturates Screen, Urine Negative Negative    Oxycodone Screen, Urine Negative Negative    Buprenorphine, Screen, Urine Negative Negative   Magnesium    Specimen: Blood   Result Value Ref Range    Magnesium 1.8 1.6 - 2.6 mg/dL   CBC Auto Differential    Specimen: Blood   Result Value Ref Range    WBC 13.58 (H) 3.40 - 10.80 10*3/mm3    RBC 5.24 4.14 - 5.80 10*6/mm3    Hemoglobin 17.4 13.0 - 17.7 g/dL    Hematocrit 49.4 37.5 - 51.0 %    MCV 94.3 79.0 - 97.0 fL    MCH 33.2 (H) 26.6 - 33.0 pg    MCHC 35.2 31.5 - 35.7 g/dL    RDW 13.5 12.3 - 15.4 %    RDW-SD 47.2 37.0 - 54.0 fl    MPV 9.0 6.0 - 12.0 fL    Platelets 265 140 - 450 10*3/mm3    Neutrophil % 84.6 (H) 42.7 - 76.0 %    Lymphocyte % 6.3 (L) 19.6 - 45.3 %    Monocyte % 8.2 5.0 - 12.0 %    Eosinophil % 0.1 (L) 0.3 - 6.2 %    Basophil % 0.5 0.0 - 1.5 %    Immature Grans % 0.3 0.0 - 0.5 %    Neutrophils, Absolute 11.50 (H) 1.70 - 7.00 10*3/mm3    Lymphocytes, Absolute 0.85 0.70 - 3.10  10*3/mm3    Monocytes, Absolute 1.11 (H) 0.10 - 0.90 10*3/mm3    Eosinophils, Absolute 0.01 0.00 - 0.40 10*3/mm3    Basophils, Absolute 0.07 0.00 - 0.20 10*3/mm3    Immature Grans, Absolute 0.04 0.00 - 0.05 10*3/mm3    nRBC 0.0 0.0 - 0.2 /100 WBC   Fentanyl, Urine - Urine, Clean Catch    Specimen: Urine, Clean Catch   Result Value Ref Range    Fentanyl, Urine Negative Negative   Urinalysis, Microscopic Only - Urine, Clean Catch    Specimen: Urine, Clean Catch   Result Value Ref Range    RBC, UA 3-5 (A) None Seen, 0-2 /HPF    WBC, UA 0-2 None Seen, 0-2 /HPF    Bacteria, UA None Seen None Seen /HPF    Squamous Epithelial Cells, UA 0-2 None Seen, 0-2 /HPF    Hyaline Casts, UA 3-6 None Seen /LPF    Methodology Automated Microscopy                  Medical Decision Making  33-year-old male presents secondary to need for alcohol detox.  Patient states has been drinking excessively.  He denies any suicidal homicidal ideation.  He denies any past medical problems.  He presents by private vehicle.    Problems Addressed:  Alcohol abuse: complicated acute illness or injury    Amount and/or Complexity of Data Reviewed  Labs: ordered. Decision-making details documented in ED Course.  Discussion of management or test interpretation with external provider(s): On-call psychiatrist via the intake nurse    Risk  Risk Details: Patient agreeable treatment plan admission to the [facility] for further evaluation and treatment.        Final diagnoses:   Alcohol abuse       ED Disposition  ED Disposition       ED Disposition   DC/Transfer to Behavioral Health    Condition   Stable    Comment   --               No follow-up provider specified.       Medication List      No changes were made to your prescriptions during this visit.            Km Apodaca PA  04/30/24 1247

## 2024-05-01 LAB
QT INTERVAL: 394 MS
QTC INTERVAL: 460 MS

## 2024-05-01 PROCEDURE — 63710000001 ONDANSETRON ODT 4 MG TABLET DISPERSIBLE: Performed by: PSYCHIATRY & NEUROLOGY

## 2024-05-01 PROCEDURE — 99222 1ST HOSP IP/OBS MODERATE 55: CPT | Performed by: PSYCHIATRY & NEUROLOGY

## 2024-05-01 RX ADMIN — Medication 1 TABLET: at 08:24

## 2024-05-01 RX ADMIN — LORAZEPAM 2 MG: 2 TABLET ORAL at 21:26

## 2024-05-01 RX ADMIN — Medication 100 MG: at 08:24

## 2024-05-01 RX ADMIN — Medication 2 TABLET: at 08:24

## 2024-05-01 RX ADMIN — LORAZEPAM 2 MG: 2 TABLET ORAL at 12:06

## 2024-05-01 RX ADMIN — ONDANSETRON 4 MG: 4 TABLET, ORALLY DISINTEGRATING ORAL at 08:25

## 2024-05-01 RX ADMIN — TRAZODONE HYDROCHLORIDE 50 MG: 50 TABLET ORAL at 21:27

## 2024-05-01 RX ADMIN — LORAZEPAM 2 MG: 2 TABLET ORAL at 08:24

## 2024-05-01 RX ADMIN — NALTREXONE HYDROCHLORIDE 50 MG: 50 TABLET, FILM COATED ORAL at 08:24

## 2024-05-01 RX ADMIN — IBUPROFEN 400 MG: 400 TABLET, FILM COATED ORAL at 08:25

## 2024-05-01 RX ADMIN — HYDROXYZINE HYDROCHLORIDE 50 MG: 50 TABLET, FILM COATED ORAL at 08:25

## 2024-05-01 NOTE — PAYOR COMM NOTE
"Carlos Ulloa (33 y.o. Male)       Date of Birth   1990    Social Security Number       Address   [address] [place] [address]    Home Phone   [phone]    MRN   [id number]       Cheondoism   None    Marital Status   Single                            Admission Date   24    Admission Type   Emergency    Admitting Provider   Joo Daniels MD    Attending Provider   Swapnil Garcia MD    Department, Room/Bed   [facility] ADULT CD, 1043/2S       Discharge Date       Discharge Disposition       Discharge Destination                                 Attending Provider: Swapnil Garcia MD    Allergies: No Known Allergies    Isolation: None   Infection: None   Code Status: CPR    Ht: 172.7 cm (68\")   Wt: 54.1 kg (119 lb 3.2 oz)    Admission Cmt: None   Principal Problem: Alcohol use disorder, severe, dependence [F10.20]                   Active Insurance as of 2024       Primary Coverage       Payor Plan Insurance Group Employer/Plan Group    [facility] BY GABRIEL [id number]       Payor Plan Address Payor Plan Phone Number Payor Plan Fax Number Effective Dates    [address] [address]   2022 - None Entered    [facility] [address]         Subscriber Name Subscriber Birth Date Member ID       CARLOS ULLOA 1990 [id number]                     Emergency Contacts        (Rel.) Home Phone Work Phone Mobile Phone    LILLIAM ULLOA (Mother) [phone] -- --          PLEASE ATTACH THE H&P INCLUDED TO PREVIOUS CLINICAL INFORMATION SUBMITTED EARLIER THIS DATE.  PATIENT WAS ADMITTED TO INPATIENT BEHAVIORAL HEALTH DETOX ON 2024    RETURN FAX NUMBER [phone]    PATIENT NAME:  CARLOS ULLOA  :  1990    FACILITY:  [facility]  NPI:  [id number]  TAX ID:  [id number]  ADDRESS:  [address] [place] [address]    ATTENDING MD:  DR. SWAPNIL GARCIA  NPI:  [id number]  ADDRESS:  SAME AS FACILITY    UR CONTACT:  GERMAN" TOM ESQUIVEL (UTILIZATION REVIEW)  PHONE:  [phone]  FAX:  [phone]               History & Physical        Swapnil Awan MD at 24 1137                INITIAL PSYCHIATRIC HISTORY & PHYSICAL    Patient Identification:  Name:  Carlos Peres  Age:  33 y.o.  Sex:  male  :  1990  MRN:  [id number]   Visit Number:  [id number]  Primary Care Physician:  Provider, No Known    SUBJECTIVE    CC/Focus of Exam: Alcohol use    HPI: Carlos Peres is a 33 y.o. male who was admitted on 2024 with complaints of alcohol use and withdrawals. The patient reports a long history of substance use. First use was at age 13 when he drank some beer and started drinking regularly in his early 20s. Over time the use increased and the patient  continued to use despite negative consequences including relationship problems, social and financial problems. The patient endorses symptoms of tolerance and withdrawals and ongoing cravings to use. Has tried to cut down and stop but has not been successful. Spends too much time and resources in pursuit of substance use. Longest period of sobriety is reported to be two years.  Currently using 10-15 drinks daily.   Last use Monday morning.   Withdrawal symptoms include tremors, nausea, insomnia.     PAST PSYCHIATRIC HX: None reported.     SUBSTANCE USE HX: See HPI for alcohol.     SOCIAL HX:   Social History     Socioeconomic History    Marital status: Single    Number of children: 0    Highest education level: Some college, no degree   Tobacco Use    Smoking status: Every Day     Current packs/day: 1.00     Average packs/day: 1 pack/day for 14.0 years (14.0 ttl pk-yrs)     Types: Cigarettes     Start date: 2010    Smokeless tobacco: Never   Vaping Use    Vaping status: Some Days    Substances: Nicotine   Substance and Sexual Activity    Alcohol use: Yes     Alcohol/week: 10.0 - 15.0 standard drinks of alcohol     Types: 10 - 15 Shots of liquor per week     Comment: See below     Drug use: Yes     Comment: ETOH only    Sexual activity: Yes     Partners: Female         Past Medical History:   Diagnosis Date    Alcohol abuse     Alcoholism     Substance abuse           Past Surgical History:   Procedure Laterality Date    NO PAST SURGERIES         Family History   Problem Relation Age of Onset    Alcohol abuse Mother     Alcohol abuse Maternal Grandfather          Medications Prior to Admission   Medication Sig Dispense Refill Last Dose    hydrOXYzine (ATARAX) 10 MG tablet Take 1 tablet by mouth 3 (Three) Times a Day As Needed for Anxiety.   Past Month    naltrexone (DEPADE) 50 MG tablet Take 1 tablet by mouth Daily.   Past Week         ALLERGIES:  Patient has no known allergies.    Temp:  [97.1 °F (36.2 °C)-97.8 °F (36.6 °C)] 97.2 °F (36.2 °C)  Heart Rate:  [] 106  Resp:  [16-18] 18  BP: (120-157)/() 126/81    REVIEW OF SYSTEMS:  Review of Systems   Constitutional:  Positive for chills, diaphoresis and fatigue.   HENT: Negative.     Eyes: Negative.    Respiratory: Negative.     Cardiovascular: Negative.    Gastrointestinal:  Positive for nausea.   Endocrine: Negative.    Genitourinary: Negative.    Musculoskeletal:  Positive for myalgias.   Skin: Negative.    Allergic/Immunologic: Negative.    Neurological:  Positive for tremors.   Psychiatric/Behavioral:  The patient is nervous/anxious.         OBJECTIVE    PHYSICAL EXAM:  Physical Exam  Constitutional:  Appears well-developed and well-nourished.   HENT:   Head: Normocephalic and atraumatic.   Right Ear: External ear normal.   Left Ear: External ear normal.   Mouth/Throat: Oropharynx is clear and moist.   Eyes: Pupils are equal, round, and reactive to light. Conjunctivae and EOM are normal.   Neck: Normal range of motion. Neck supple.   Cardiovascular: Normal rate, regular rhythm and normal heart sounds.    Respiratory: Effort normal and breath sounds normal. No respiratory distress. No wheezes.   GI: Soft. Bowel sounds are  normal.No distension. There is no tenderness.   Musculoskeletal: Normal range of motion. No edema or deformity.   Neurological:No cranial nerve deficit. Coordination normal.   Skin: Skin is warm and dry. No rash noted. No erythema.     MENTAL STATUS EXAM:   Hygiene:   fair  Cooperation:  Cooperative  Eye Contact:  Fair  Psychomotor Behavior:  Appropriate  Affect:  Appropriate  Hopelessness: Denies  Speech:  Normal  Thought Process: Goal directed  Thought Content:  Normal  Suicidal:  None  Homicidal:  None  Hallucinations:  None  Delusion:  None  Memory:  Intact  Orientation:  Person, Place, Time and Situation  Reliability:  fair  Insight:  Fair  Judgement:  Fair  Impulse Control:  Fair    Imaging Results (Last 24 Hours)       ** No results found for the last 24 hours. **             ECG/EMG Results (most recent)       Procedure Component Value Units Date/Time    ECG 12 Lead Other; Baseline Cardiac Status [[id number]] Collected: 04/30/24 1723     Updated: 05/01/24 0911     QT Interval 394 ms      QTC Interval 460 ms     Narrative:      Test Reason : Other~  Blood Pressure :   */*   mmHG  Vent. Rate :  82 BPM     Atrial Rate :  82 BPM     P-R Int : 148 ms          QRS Dur : 100 ms      QT Int : 394 ms       P-R-T Axes :  63  84  70 degrees     QTc Int : 460 ms    Normal sinus rhythm  Normal ECG  No previous ECGs available  Confirmed by Stephanie Sterling ([id number]) on 5/1/2024 9:11:11 AM    Referred By: RIYA           Confirmed By: Stephanie Sterling             Lab Results   Component Value Date    GLUCOSE 160 (H) 04/30/2024    BUN 14 04/30/2024    CREATININE 0.90 04/30/2024    BCR 15.6 04/30/2024    CO2 24.4 04/30/2024    CALCIUM 10.1 04/30/2024    ALBUMIN 4.9 04/30/2024    AST 29 04/30/2024    ALT 24 04/30/2024       Lab Results   Component Value Date    WBC 13.58 (H) 04/30/2024    HGB 17.4 04/30/2024    HCT 49.4 04/30/2024    MCV 94.3 04/30/2024     04/30/2024       Last Urine Toxicity          Latest Ref Rng &  Units 4/30/2024   LAST URINE TOXICITY RESULTS   Amphetamine, Urine Qual Negative Negative    Barbiturates Screen, Urine Negative Negative    Benzodiazepine Screen, Urine Negative Negative    Buprenorphine, Screen, Urine Negative Negative    Cocaine Screen, Urine Negative Negative    Fentanyl, Urine Negative Negative    Methadone Screen , Urine Negative Negative    Methamphetamine, Ur Negative Negative        Brief Urine Lab Results  (Last result in the past 365 days)        Color   Clarity   Blood   Leuk Est   Nitrite   Protein   CREAT   Urine HCG        04/30/24 1014 Dark Yellow   Clear   Negative   Trace   Negative   100 mg/dL (2+)                   Strengths: Motivated for treatment    Weaknesses:Substance use and Poor coping skills    Code status:  Full  Discussed code status with patient.    ASSESSMENT & PLAN:    Hospital bed: No      Alcohol use disorder, severe, dependence  -Ativan detox  -Thiamine and folate      The patient has been admitted for safety and stabilization.  Patient will be monitored for suicidality daily and maintained on Special Precautions Level 4 (q30 min checks).  The patient will have individual and group therapy with a master's level therapist. A master treatment plan will be developed and agreed upon by the patient and his/her treatment team.  The patient's estimated length of stay in the hospital is 5-7 days.             Electronically signed by Swapnil Awan MD at 05/01/24 7881

## 2024-05-01 NOTE — PAYOR COMM NOTE
[employer] [facility]   Behavioral Health Service Request Form     Member Information    Line of Business: Medicaid  Date of Request: 24  State[place]  Member Name: Carlos Peres  XAVIER (MM/DD/YYYY): 1990  Member ID#: [id number]  Member Phone:  [phone]  Service Type: Emergent Inpatient Admission     Referral/Service Type Requested     Request Type: Initial Request  Previous Auth#: N/A  Inpatient Services: Inpatient Detoxification    Outpatient Services: N/A    Please Send Clinical Notes And Any Supporting Documentation     Primary ICD-10 Code for Treatment: F10.20  Description: Alcohol use disorder, severe, dependence     Dates of Service:  Date of admission to inpatient behavioral health detox is 2024   Procedure:  N/A  Service Codes:  0126 (Rev code)  ASA level 4.0  Diagnosis Code:  F10.20   Requested Service:  Initial authorization request for admission to inpatient behavioral health detox  Requested Units/Visits:  Five (5) days    Provider Information  Requesting Provider / Facility: [facility]    NPI#: [id number]  TIN#: [id number]  Phone: [phone]  Fax:[phone]    Address: [address] [place] [address]  State: [place]  Zip: [address]  PCP Name: No PCP listed  PCP Phone: N/A  Office Contact Name: Silvia ESQUIVEL RN (Utilization Review)  Office Contact Phone: [phone]    Servicing Provider / Facility:   Provider/Facility Name (Required): Swapnil Awan MD  NPI#: [id number]  TIN#: [id number] (Facility)    Phone: [phone] (Silvia ESQUIVEL RN)  Fax: [phone]  Address: [address] [place] [address]: [place]  Zip: [address]          Carlos Peres (33 y.o. Male)       Date of Birth   1990    Social Security Number       Address   [address] [place], [place] [address]    Home Phone   [phone]    MRN   [id number]       Religious   None    Marital Status   Single                            Admission Date   24    Admission  "Type   Emergency    Admitting Provider   Joo Daniels MD    Attending Provider   Swapnil Awan MD    Department, Room/Bed   [facility] ADULT CD, 1043/2S       Discharge Date       Discharge Disposition       Discharge Destination                                 Attending Provider: Swapnil Awan MD    Allergies: No Known Allergies    Isolation: None   Infection: None   Code Status: CPR    Ht: 172.7 cm (68\")   Wt: 54.1 kg (119 lb 3.2 oz)    Admission Cmt: None   Principal Problem: EtOH dependence [F10.20]                   Active Insurance as of 4/30/2024       Primary Coverage       Payor Plan Insurance Group Employer/Plan Group    [employer] [id number]       Payor Plan Address Payor Plan Phone Number Payor Plan Fax Number Effective Dates    [address] [address]   12/1/2022 - None Entered    [facility] [id number]         Subscriber Name Subscriber Birth Date Member ID       ALMA ULLOA 1990 [id number]                     Emergency Contacts        (Rel.) Home Phone Work Phone Mobile Phone    LILLIAM ULLOA (Mother) [phone] -- --                 Intake Information - 04/30/24 1352    What problem (s) brought you here today? Presented to ED requesting detox.  Reports that he went to [facility] yesterday and was experiencing nausea, vomiting and tremors and they wanted him to go through detox.  Reports that he has had a problem with alcohol since he was 18 years old.  Longest period of sobriety was 2 years.  Has had multiple detox and rehab admissions.  Most recently was in rehab from 3/28/24 until 4/19/24.  Reports that he was sober for a few days after leaving rehab and for the last 4 days has been drinking 10-15 shots per day.  Plans to go to [facility] upon discharge. No prior [facility] admissions.     Medical/Surgical/Psychosocial History - 04/30/24 1038    Approximate date of last complete physical examination --  \"I can't really remember right now\"   Do " "you believe that you need HIV testing? No   Do you believe that you need Hepatitis C testing? No   Have you done anything to injure or harm yourself today? No   Previous Mental Health Treatment none   Previous Substance Use Treatment outpatient rehab;detox unit   Number of previous detox admissions --  \"multiple'     Family History - 04/30/24 1039    Marital status Single   Children? No   Place of [facility] [place]   Parent Marital Status      Housing/Living Environment - 04/30/24 1039    Current Living Arrangements other (see comments)  [facility] x 1 day     Education/Work/Income - 04/30/24 1039    Type of education GED   Employment Status unemployed   Source of Income none     Support System - 04/30/24 1039    Community resources/support systems current used Family/relatives;Friends      - 04/30/24 1040    Has patient been in the ? No     Spiritual - 04/30/24 1040    Are there spiritual concerns you feel need addressed during treatment? No   Spiritual care consult requested No     Sexual - 04/30/24 1040    Patient is in a monogamous relationship? No   Has patient ever been accused of inappropriate sexual behavior? No     Legal Issues - 04/30/24 1040    Has patient ever been arrested, charged or convicted of a crime? No   Does patient currently have any outstanding charges? No     Recreational - 04/30/24 1040    Solitary Music   Additional activities \"Playing poker'   Patient tends to spend time With friends   Patient's ability to be close to others has been affected by Alcohol     Current Stressors - 04/30/24 1040    Current stressors Other (comment)   Current stressor details \"feeling like shit and me and my girl broke up recently\"     Screenings - 04/30/24 1041    Abuse Screen (yes response referral indicated)   Feels Unsafe at Home or Work/School no   Feels Threatened by Someone no   Does Anyone Try to Keep You From Having Contact with Others or Doing " Things Outside Your Home? no   Physical Signs of Abuse Present no   AUDIT-C (Alcohol Use Disorders ID Test)   Q1: How often do you have a drink containing alcohol? 4 or more ti   Q2: How many drinks containing alcohol do you have on a typical day when you are drinking? 10 or more   Q3: How often do you have six or more drinks on one occasion? Daily   Audit-C Score 12   CIWA-Ar (Alcohol Withdrawal Assessment)   Nausea and Vomiting 2   Tremor 3   Paroxysmal Sweats 1-->barely perceptible sweating, palms moist   Anxiety 1-->mildly anxious   Agitation 1-->somewhat more than normal activity   Tactile Disturbances 0-->none   Auditory Disturbances 3-->moderate harshness or ability to frighten   Visual Disturbances 0-->not present   Headache, Fullness in Head 0-->not present   Orientation and Clouding of Sensorium 0-->oriented and can do serial additions   CIWA-Ar Score 11     Chemical Dependency Addendum - 04/30/24 1042    Patient feels alcohol/drug use is a problem for Both   Patient reports others have expressed concern about patient's alcohol/drug use No   The amount required to get the desired effect has Increased   Memory loss/blackouts during use Yes   Current withdrawal symptoms Anxiety;Depression;Stomach cramps;Leg cramps;Vomiting;Nausea;Body aches   Patient has experienced DTs Visual hallucinations   Longest period of sobriety? 2 years   Patient rated craving level 0   Has patient ever accidentally overdosed on drugs? No     Psychosocial Assessment - 04/30/24 1042    General Appearance WDL   General Appearance WDL appearance   General Appearance unkempt   Activity WDL   Activity WDL activity   Activity tremors   Daily Functioning WDL   Daily functioning WDL daily functioning   Daily functioning exceptions poor appetite;poor sleep, terminal insomnia   Speech WDL   Speech WDL WDL   Attitude WDL   Attitude WDL ex   Attitude exceptions overly dramatic   Thought Process WDL   Thought Process WDL WDL   Perceptual State  WDL   Perceptual State WDL WDL   Emotion Mood WDL   Emotion/Mood/Affect WDL all   Affect affect consistent with mood   Emotion/Mood anxious;depressed   Patient rated depression level 2   Patient rated anxiety level 3   Cognitive Function WDL   Cognitive function WDL WDL   Functional Status   Usual Activity Tolerance good   Current Activity Tolerance good          History as of 4/30/2024  Medical History    Diagnosis Date Comment Source   Alcohol abuse      Alcoholism      Substance abuse        Surgical History    Procedure Laterality Date Comment Source   NO PAST SURGERIES         Family History  Pedigree Relation Problem Comments   Mother Alcohol abuse       Maternal Grandfather Alcohol abuse         Tobacco Use    Every Day; Cigarettes: Started 4/30/2010; 1 pack/day; Smoked an average of 1 pack/day for 14.0 years; Total pack years: 14.0   Smokeless Tobacco: Never used smokeless tobacco.   Tobacco Cessation: Ready to quit: Not Asked; Counseling given: Not Answered     Vaping Use    Some days; Substances: Nicotine    Alcohol Use    Yes; 10.0 - 15.0 standard drinks of alcohol per week; 10-15 Shots of liquor.   Comments: See below     Drug Use    Yes.   Comments: ETOH only     Sexual Activity    Sexually active; Partners: Female.     Employment History    Occupation Employer Comments   Unemployed       Family and Education    Marital Status Number of Children Highest Education Level   Single 0 Some college, no degree     Social Identity    Preferred Language Ethnicity Race   English Not  or  White or         Social Documentation    No social documentation on file.         Urine Drug Screen - Urine, Clean Catch    Status: Final result        Component  Ref Range & Units 04/30/24 1014   THC, Screen, Urine  Negative Negative   Phencyclidine (PCP), Urine  Negative Negative   Cocaine Screen, Urine  Negative Negative   Methamphetamine, Ur  Negative Negative   Opiate Screen  Negative Negative  "  Amphetamine Screen, Urine  Negative Negative   Benzodiazepine Screen, Urine  Negative Negative   Tricyclic Antidepressants Screen  Negative Negative   Methadone Screen, Urine  Negative Negative   Barbiturates Screen, Urine  Negative Negative   Oxycodone Screen, Urine  Negative Negative   Buprenorphine, Screen, Urine  Negative Negative            Ethanol    Status: Final result        Component  Ref Range & Units 04/30/24 1021   Ethanol  0 - 10 mg/dL <10   Ethanol %  % <0.010              Patient Observation   Patient Observations -- -- -- -- --   Row Name 04/30/24 0959       Vital Signs   Temp 98.4 °F (36.9 °C)       Temp src Oral       Pulse 71       Heart Rate Source Monitor       Resp 17       Resp Rate Source Visual       /95           Vital Signs    Row Name 05/01/24 0924 05/01/24 0840 05/01/24 0800 05/01/24 0430 05/01/24 0030   Vital Signs   Temp -- -- 97.2 °F (36.2 °C) 97.1 °F (36.2 °C) 97.7 °F (36.5 °C)   Temp src -- -- Temporal Temporal Temporal   Pulse 106 -- 107 85 80   Heart Rate Source Monitor -- Monitor Monitor Monitor   Resp -- -- 18 18 16   Resp Rate Source -- -- Visual Visual Visual   /81 -- 157/108 Abnormal  120/74 127/83       Current Scheduled Medications  Collapse  Hide  (From now, onward)    Start   Ordered Stop   05/04/24 0000  LORazepam (ATIVAN) tablet 0.5 mg  0.5 mg,   Oral,   3 times per day        References:    CIWA PRN Lorazepam    [facility]    Pediatrics   \"Followed by\" Linked Group Details    04/30/24 1205 05/05/24 0759   05/03/24 0000  LORazepam (ATIVAN) tablet 1 mg  1 mg,   Oral,   3 times per day        References:    CIWA PRN Lorazepam    [facility]    Pediatrics   \"Followed by\" Linked Group Details    04/30/24 1205 05/04/24 0759   05/02/24 0000  LORazepam (ATIVAN) tablet 1.5 mg  1.5 mg,   Oral,   3 times per day        References:    [facility]    Pediatrics   \"Followed by\" Linked Group Details    04/30/24 1205 05/03/24 0759   05/01/24 0800  LORazepam (ATIVAN) tablet 2 " "mg  2 mg,   Oral,   3 times per day        References:    CIWA PRN Lorazepam    [facility]    [facility]   \"Followed by\" Linked Group Details    04/30/24 1205 05/02/24 0759   04/30/24 1300  B-complex with vitamin C tablet 2 tablet  2 tablet,   Oral,   Daily        References:    [facility]    Pediatrics    04/30/24 1205 --   04/30/24 1300  thiamine (VITAMIN B-1) tablet 100 mg  100 mg,   Oral,   Daily        References:    [facility]    [facility]ediatric[facility]    04/30/24 1205 --   04/30/24 1300  multivitamin with minerals 1 tablet  1 tablet,   Oral,   Daily        References:    [facility]    Pediatric[facility]    04/30/24 1205 --   04/30/24 1300  naltrexone (DEPADE) tablet 50 mg  50 mg,   Oral,   Daily        References:    [facility]    Pediatric[facility]    04/30/24 1255             PRN MEDICATIONS UTILIZED:    aluminum-magnesium hydroxide-simethicone (MAALOX MAX) 400-400-40 MG/5ML suspension 15 mL  Dose: 15 mL  Freq: Every 6 Hours PRN Route: PO  PRN Reasons: Indigestion,Heartburn  Start: 04/30/24 1205  RECEIVED ON 04/30/2024 AT 1357 PM EST    hydrOXYzine (ATARAX) tablet 50 mg  Dose: 50 mg  Freq: Every 6 Hours PRN Route: PO  PRN Reason: Anxiety  Start: 04/30/24 1205  RECEIVED ON 04/30/2024 AT 1353 PM EST  RECEIVED ON 05/01/2024 AT 0825 AM EST    ibuprofen (ADVIL,MOTRIN) tablet 400 mg  Dose: 400 mg  Freq: Every 6 Hours PRN Route: PO  PRN Reasons: Mild Pain,Moderate Pain  PRN Comment: Severe Pain (7-10)  Start: 04/30/24 1205  RECEIVED ON 04/30/2024 AT 1353 PM EST  RECEIVED ON 05/01/2024 AT 0825 AM EST    LORazepam (ATIVAN) tablet 2 mg  Dose: 2 mg  Freq: Every 2 Hours PRN Route: PO  PRN Reason: Withdrawal  PRN Comment: CIWA 10 or greater, SBP >140, DBP >90, or HR >100  Indications Comment: Withdrawal  Start: 04/30/24 1300 End: 05/01/24 1259  RECEIVED ON 04/30/2024 AT 1639 PM EST    ondansetron ODT (ZOFRAN-ODT) disintegrating tablet 4 mg  Dose: 4 mg  Freq: Every 6 Hours PRN Route: TL  PRN Reasons: Nausea,Vomiting  Start: 04/30/24 1205  RECEIVED ON " 04/30/2024 AT 1353 PM EST  RECEIVED ON 05/01/2024 AT 0825 AM EST        THE H&P IS STILL PENDING AT THIS TIME AND WILL BE SUBMITTED ONCE AVAILABLE

## 2024-05-01 NOTE — H&P
INITIAL PSYCHIATRIC HISTORY & PHYSICAL    Patient Identification:  Name:  Carlos Peres  Age:  33 y.o.  Sex:  male  :  1990  MRN:  [id number]   Visit Number:  [id number]  Primary Care Physician:  Provider, No Known    SUBJECTIVE    CC/Focus of Exam: Alcohol use    HPI: Carlos Peres is a 33 y.o. male who was admitted on 2024 with complaints of alcohol use and withdrawals. The patient reports a long history of substance use. First use was at age 13 when he drank some beer and started drinking regularly in his early 20s. Over time the use increased and the patient  continued to use despite negative consequences including relationship problems, social and financial problems. The patient endorses symptoms of tolerance and withdrawals and ongoing cravings to use. Has tried to cut down and stop but has not been successful. Spends too much time and resources in pursuit of substance use. Longest period of sobriety is reported to be two years.  Currently using 10-15 drinks daily.   Last use Monday morning.   Withdrawal symptoms include tremors, nausea, insomnia.     PAST PSYCHIATRIC HX: None reported.     SUBSTANCE USE HX: See HPI for alcohol.     SOCIAL HX:   Social History     Socioeconomic History    Marital status: Single    Number of children: 0    Highest education level: Some college, no degree   Tobacco Use    Smoking status: Every Day     Current packs/day: 1.00     Average packs/day: 1 pack/day for 14.0 years (14.0 ttl pk-yrs)     Types: Cigarettes     Start date: 2010    Smokeless tobacco: Never   Vaping Use    Vaping status: Some Days    Substances: Nicotine   Substance and Sexual Activity    Alcohol use: Yes     Alcohol/week: 10.0 - 15.0 standard drinks of alcohol     Types: 10 - 15 Shots of liquor per week     Comment: See below    Drug use: Yes     Comment: ETOH only    Sexual activity: Yes     Partners: Female         Past Medical History:   Diagnosis Date    Alcohol abuse      Alcoholism     Substance abuse           Past Surgical History:   Procedure Laterality Date    NO PAST SURGERIES         Family History   Problem Relation Age of Onset    Alcohol abuse Mother     Alcohol abuse Maternal Grandfather          Medications Prior to Admission   Medication Sig Dispense Refill Last Dose    hydrOXYzine (ATARAX) 10 MG tablet Take 1 tablet by mouth 3 (Three) Times a Day As Needed for Anxiety.   Past Month    naltrexone (DEPADE) 50 MG tablet Take 1 tablet by mouth Daily.   Past Week         ALLERGIES:  Patient has no known allergies.    Temp:  [97.1 °F (36.2 °C)-97.8 °F (36.6 °C)] 97.2 °F (36.2 °C)  Heart Rate:  [] 106  Resp:  [16-18] 18  BP: (120-157)/() 126/81    REVIEW OF SYSTEMS:  Review of Systems   Constitutional:  Positive for chills, diaphoresis and fatigue.   HENT: Negative.     Eyes: Negative.    Respiratory: Negative.     Cardiovascular: Negative.    Gastrointestinal:  Positive for nausea.   Endocrine: Negative.    Genitourinary: Negative.    Musculoskeletal:  Positive for myalgias.   Skin: Negative.    Allergic/Immunologic: Negative.    Neurological:  Positive for tremors.   Psychiatric/Behavioral:  The patient is nervous/anxious.         OBJECTIVE    PHYSICAL EXAM:  Physical Exam  Constitutional:  Appears well-developed and well-nourished.   HENT:   Head: Normocephalic and atraumatic.   Right Ear: External ear normal.   Left Ear: External ear normal.   Mouth/Throat: Oropharynx is clear and moist.   Eyes: Pupils are equal, round, and reactive to light. Conjunctivae and EOM are normal.   Neck: Normal range of motion. Neck supple.   Cardiovascular: Normal rate, regular rhythm and normal heart sounds.    Respiratory: Effort normal and breath sounds normal. No respiratory distress. No wheezes.   GI: Soft. Bowel sounds are normal.No distension. There is no tenderness.   Musculoskeletal: Normal range of motion. No edema or deformity.   Neurological:No cranial nerve deficit.  Coordination normal.   Skin: Skin is warm and dry. No rash noted. No erythema.     MENTAL STATUS EXAM:   Hygiene:   fair  Cooperation:  Cooperative  Eye Contact:  Fair  Psychomotor Behavior:  Appropriate  Affect:  Appropriate  Hopelessness: Denies  Speech:  Normal  Thought Process: Goal directed  Thought Content:  Normal  Suicidal:  None  Homicidal:  None  Hallucinations:  None  Delusion:  None  Memory:  Intact  Orientation:  Person, Place, Time and Situation  Reliability:  fair  Insight:  Fair  Judgement:  Fair  Impulse Control:  Fair    Imaging Results (Last 24 Hours)       ** No results found for the last 24 hours. **             ECG/EMG Results (most recent)       Procedure Component Value Units Date/Time    ECG 12 Lead Other; Baseline Cardiac Status [[id number]] Collected: 04/30/24 1723     Updated: 05/01/24 0911     QT Interval 394 ms      QTC Interval 460 ms     Narrative:      Test Reason : Other~  Blood Pressure :   */*   mmHG  Vent. Rate :  82 BPM     Atrial Rate :  82 BPM     P-R Int : 148 ms          QRS Dur : 100 ms      QT Int : 394 ms       P-R-T Axes :  63  84  70 degrees     QTc Int : 460 ms    Normal sinus rhythm  Normal ECG  No previous ECGs available  Confirmed by Stephanie Sterling ([id number]) on 5/1/2024 9:11:11 AM    Referred By: RIYA           Confirmed By: Stephanie Sterling             Lab Results   Component Value Date    GLUCOSE 160 (H) 04/30/2024    BUN 14 04/30/2024    CREATININE 0.90 04/30/2024    BCR 15.6 04/30/2024    CO2 24.4 04/30/2024    CALCIUM 10.1 04/30/2024    ALBUMIN 4.9 04/30/2024    AST 29 04/30/2024    ALT 24 04/30/2024       Lab Results   Component Value Date    WBC 13.58 (H) 04/30/2024    HGB 17.4 04/30/2024    HCT 49.4 04/30/2024    MCV 94.3 04/30/2024     04/30/2024       Last Urine Toxicity          Latest Ref Rng & Units 4/30/2024   LAST URINE TOXICITY RESULTS   Amphetamine, Urine Qual Negative Negative    Barbiturates Screen, Urine Negative Negative     Benzodiazepine Screen, Urine Negative Negative    Buprenorphine, Screen, Urine Negative Negative    Cocaine Screen, Urine Negative Negative    Fentanyl, Urine Negative Negative    Methadone Screen , Urine Negative Negative    Methamphetamine, Ur Negative Negative        Brief Urine Lab Results  (Last result in the past 365 days)        Color   Clarity   Blood   Leuk Est   Nitrite   Protein   CREAT   Urine HCG        04/30/24 1014 Dark Yellow   Clear   Negative   Trace   Negative   100 mg/dL (2+)                   Strengths: Motivated for treatment    Weaknesses:Substance use and Poor coping skills    Code status:  Full  Discussed code status with patient.    ASSESSMENT & PLAN:    Hospital bed: No      Alcohol use disorder, severe, dependence  -Ativan detox  -Thiamine and folate      The patient has been admitted for safety and stabilization.  Patient will be monitored for suicidality daily and maintained on Special Precautions Level 4 (q30 min checks).  The patient will have individual and group therapy with a master's level therapist. A master treatment plan will be developed and agreed upon by the patient and his/her treatment team.  The patient's estimated length of stay in the hospital is 5-7 days.

## 2024-05-01 NOTE — PLAN OF CARE
Goal Outcome Evaluation:  Plan of Care Reviewed With: patient  Patient Agreement with Plan of Care: agrees     Progress: no change  Outcome Evaluation: New admission previous shift; Pt sleeping the majority of the evening. Pt denies SI/HI/AVH.  Pt appeared to sleep throughout the night; approximately 9 hours this shift.

## 2024-05-01 NOTE — PLAN OF CARE
Goal Outcome Evaluation:        Problem: Adult Behavioral Health Plan of Care  Goal: Patient-Specific Goal (Individualization)  Outcome: Ongoing, Progressing  Flowsheets  Taken 5/1/2024 1020 by Natalie Gama CSW  Patient-Specific Goals (Include Timeframe): Identify 2-3 coping skills, address relapse prevention methods, complete aftercare plans and deny SI/HI prior to discharge.  Individualized Care Needs: Therapist to offer 1-4 therapy sessions, aftercare planning, safety planning, family education, group therapy, and brief CBT/MI interventions.  Taken 5/1/2024 1015 by Natalie Gama CSW  Patient Personal Strengths:   resilient   resourceful   motivated for treatment   motivated for recovery  Patient Vulnerabilities:   substance abuse/addiction   history of unsuccessful treatment   poor impulse control  Taken 4/30/2024 1342 by Claire Molina RN  Anxieties, Fears or Concerns: None verbalized  Goal: Optimized Coping Skills in Response to Life Stressors  Outcome: Ongoing, Progressing  Flowsheets (Taken 5/1/2024 1020)  Optimized Coping Skills in Response to Life Stressors: making progress toward outcome  Intervention: Promote Effective Coping Strategies  Flowsheets (Taken 5/1/2024 1020)  Supportive Measures:   active listening utilized   counseling provided   decision-making supported   goal-setting facilitated   verbalization of feelings encouraged   self-responsibility promoted   self-reflection promoted   positive reinforcement provided   self-care encouraged  Goal: Develops/Participates in Therapeutic [place] to Support Successful Transition  Outcome: Ongoing, Progressing  Flowsheets (Taken 5/1/2024 1020)  Develops/Participates in Therapeutic [place] to Support Successful Transition: making progress toward outcome  Intervention: Foster Therapeutic [place]  Flowsheets (Taken 5/1/2024 1020)  Trust Relationship/Rapport:   care explained   questions encouraged   choices provided   reassurance  provided   emotional support provided   thoughts/feelings acknowledged   empathic listening provided   questions answered  Intervention: Mutually Develop Transition Plan  Flowsheets  Taken 5/1/2024 1020  Outpatient/Agency/Support Group Needs: residential services  Transition Support:   crisis management plan promoted   follow-up care discussed   follow-up care coordinated   community resources reviewed   crisis management plan verbalized  Anticipated Discharge Disposition: residential substance use unit  Taken 5/1/2024 1019  Discharge Coordination/Progress: Patient has Passport Medicaid. Therapist met with patient to complete assessment. Patient presents from [facility].  Transportation Anticipated: agency  Transportation Concerns: none  Current Discharge Risk: substance use/abuse  Concerns to be Addressed:   substance/tobacco abuse/use   coping/stress   cognitive/perceptual   mental health   discharge planning  Readmission Within the Last 30 Days: no previous admission in last 30 days  Patient/Family Anticipated Services at Transition: rehabilitation services  Patient's Choice of Community Agency(s): [facility]  Patient/Family Anticipates Transition to: inpatient rehabilitation facility  Offered/Gave Vendor List: no         DATA:      Therapist met individually with patient this date to introduce role and to discuss hospitalization expectations. Patient agreeable.     Consent in chartlet for mother.     Clinical Maneuvering/Intervention:     Therapist assisted patient in processing session content; acknowledged and normalized patient’s thoughts, feelings, and concerns. Discussed the therapist/patient relationship and explain the parameters and limitations of relative confidentiality. Also discussed the importance of active participation, and honesty to the treatment process. Encouraged the patient to discuss/vent their feelings, frustrations, and fears concerning their ongoing medical issues and validated their feelings.      Discussed the importance of finding enjoyable activities and coping skills that the patient can engage in a regular basis. Discussed healthy coping skills such as distraction, self love, grounding, thought challenges/reframing, etc. Provided patient with list of healthy coping skills this date. Discussed the importance of medication compliance. Praised the patient for seeking help and spent the majority of the session building rapport.       Allowed patient to freely discuss issues without interruption or judgment. Provided safe, confidential environment to facilitate the development of positive therapeutic relationship and encourage open, honest communication.      Therapist addressed discharge safety planning this date. Assisted patient in identifying risk factors which would indicate the need for higher level of care after discharge;  including thoughts to harm self or others and/or self-harming behavior. Encouraged patient to call 911, or present to the nearest emergency room should any of these events occur. Discussed crisis intervention services and means to access. Encouraged securing any objects of harm.       Therapist completed integrated summary, treatment plan, and initiated social history this date. Therapist is strongly encouraging family involvement in treatment.       ASSESSMENT:      The patient is a 32 y/o male admitted for alcohol detox treatment. Therapist met with patient on this date to complete assessment, patient difficult to wake. Patient reports mild depression and anxiety, denies SI/HI/AVH. Patient experiencing tremors, nausea, and insomnia. Patient has had no past [facility] admissions. He began drinking at 14 y/o, longest period of sobriety has been two years. Patient normally lives at home with his mother and is unemployed. He was brought to the ED by [facility] and plans to return there at discharge, agency to provide transportation pending acceptance. He is  agreeable to his mother being involved in his treatment. Patient denies having any additional needs or concerns at this time.      PLAN:       Patient to remain hospitalized this date.     Treatment team will focus efforts on stabilizing patient's acute symptoms while providing education on healthy coping and crisis management to reduce hospitalizations. Patient requires daily psychiatrist evaluation and 24/7 nursing supervision to promote patient safety.     Therapist will offer 1-4 individual sessions, 1 therapy group daily, family education, and appropriate referral.    Therapist recommends [facility] residential rehab.

## 2024-05-01 NOTE — PLAN OF CARE
"Goal Outcome Evaluation:  Plan of Care Reviewed With: patient  Patient Agreement with Plan of Care: agrees     Progress: improving  Outcome Evaluation: Pt has been calm and cooperative this shift. Pt rates anxiety and depression both 3/10. Pt denies SI/HI/AVH. Pt reports \"pretty good\" sleep and fair appetite. Pt has had no compalints this shift.                               "

## 2024-05-02 PROCEDURE — 99232 SBSQ HOSP IP/OBS MODERATE 35: CPT | Performed by: PSYCHIATRY & NEUROLOGY

## 2024-05-02 RX ADMIN — LORAZEPAM 1.5 MG: 1 TABLET ORAL at 08:27

## 2024-05-02 RX ADMIN — LORAZEPAM 1.5 MG: 1 TABLET ORAL at 21:14

## 2024-05-02 RX ADMIN — Medication 100 MG: at 08:28

## 2024-05-02 RX ADMIN — HYDROXYZINE HYDROCHLORIDE 50 MG: 50 TABLET, FILM COATED ORAL at 21:14

## 2024-05-02 RX ADMIN — NALTREXONE HYDROCHLORIDE 50 MG: 50 TABLET, FILM COATED ORAL at 08:27

## 2024-05-02 RX ADMIN — IBUPROFEN 400 MG: 400 TABLET, FILM COATED ORAL at 08:28

## 2024-05-02 RX ADMIN — Medication 2 TABLET: at 08:28

## 2024-05-02 RX ADMIN — LORAZEPAM 1.5 MG: 1 TABLET ORAL at 14:26

## 2024-05-02 RX ADMIN — Medication 1 TABLET: at 08:28

## 2024-05-02 RX ADMIN — TRAZODONE HYDROCHLORIDE 50 MG: 50 TABLET ORAL at 21:14

## 2024-05-02 RX ADMIN — HYDROXYZINE HYDROCHLORIDE 50 MG: 50 TABLET, FILM COATED ORAL at 08:28

## 2024-05-02 NOTE — PLAN OF CARE
Goal Outcome Evaluation:        Problem: Adult Behavioral Health Plan of Care  Goal: Patient-Specific Goal (Individualization)  Outcome: Ongoing, Progressing  Flowsheets  Taken 5/1/2024 1020 by Natalie Gama CSW  Patient-Specific Goals (Include Timeframe): Identify 2-3 coping skills, address relapse prevention methods, complete aftercare plans and deny SI/HI prior to discharge.  Individualized Care Needs: Therapist to offer 1-4 therapy sessions, aftercare planning, safety planning, family education, group therapy, and brief CBT/MI interventions.  Taken 5/1/2024 1015 by Natalie Gama CSW  Patient Personal Strengths:   resilient   resourceful   motivated for treatment   motivated for recovery   family/social support   positive educational history   stable living environment   independent living skills  Patient Vulnerabilities:   substance abuse/addiction   history of unsuccessful treatment   poor impulse control  Taken 4/30/2024 1342 by Claire Molnia RN  Anxieties, Fears or Concerns: None verbalized  Goal: Optimized Coping Skills in Response to Life Stressors  Outcome: Ongoing, Progressing  Flowsheets (Taken 5/1/2024 1020)  Optimized Coping Skills in Response to Life Stressors: making progress toward outcome  Intervention: Promote Effective Coping Strategies  Flowsheets (Taken 5/2/2024 7745)  Supportive Measures:   active listening utilized   counseling provided   decision-making supported   goal-setting facilitated   verbalization of feelings encouraged   self-responsibility promoted   self-reflection promoted   positive reinforcement provided   self-care encouraged  Goal: Develops/Participates in Therapeutic [place] to Support Successful Transition  Outcome: Ongoing, Progressing  Flowsheets (Taken 5/1/2024 1020)  Develops/Participates in Therapeutic [place] to Support Successful Transition: making progress toward outcome  Intervention: Foster Therapeutic [place]  Flowsheets (Taken 5/2/2024  0745)  Trust Relationship/Rapport:   care explained   choices provided   questions encouraged   reassurance provided   emotional support provided   thoughts/feelings acknowledged   empathic listening provided   questions answered  Intervention: Mutually Develop Transition Plan  Flowsheets  Taken 5/2/2024 6910  Discharge Coordination/Progress: Patient has Passport Medicaid. Patient expected to return to [facility] at discharge.  Transportation Anticipated: agency  Transportation Concerns: none  Current Discharge Risk: substance use/abuse  Concerns to be Addressed:   substance/tobacco abuse/use   coping/stress   cognitive/perceptual   mental health   discharge planning  Readmission Within the Last 30 Days: no previous admission in last 30 days  Patient/Family Anticipated Services at Transition: rehabilitation services  Patient's Choice of Community Agency(s): [facility]  Patient/Family Anticipates Transition to: inpatient rehabilitation facility  Offered/Gave Vendor List: no  Taken 5/1/2024 1020  Outpatient/Agency/Support Group Needs: residential services  Transition Support:   crisis management plan promoted   follow-up care discussed   follow-up care coordinated   community resources reviewed   crisis management plan verbalized  Anticipated Discharge Disposition: residential substance use unit         DATA:  Therapist met with Patient individually this date. Patient agreeable to discuss current treatment progress and discharge concerns.     Patient signed consent for [facility]. Therapist spoke with Natalie and confirmed patient can return at discharge.     CLINICAL MANUVERING/INTERVENTIONS:    Assisted Patient in processing session content; acknowledged and normalized Patient’s thoughts, feelings, and concerns by utilizing a person-centered approach in efforts to build appropriate rapport and a positive therapeutic relationship with open and honest communication. Allowed Patient to ventilate regarding current  stressors and triggers for negative emotions and thoughts in a safe nonjudgmental environment with unconditional positive regard, active listening skills, and empathy. Therapist implemented motivational interviewing techniques to assist Patient with exploring personal growth and change and discussed distress tolerance skills, self soothing techniques, and applied cognitive behavioral strategies to facilitate identification of maladaptive patterns of thinking and behavior.Therapist utilized dialectical behavior techniques to teach and model emotional regulation and relaxation methods. Therapist assisted Patient with identifying and implementing healthier coping strategies. Therapist assisted Patient with safety planning; Patient agreed to continue honest communication with Treatment Team while inpatient and identify any SI/HI. Patient encouraged to seek nearest ER or contact 911 if danger to self or others post discharge.     ASSESSMENT:  Therapist met with patient on this date, he continues to receive treatment for alcohol detox. Patient reports feeling okay today, not really experiencing any significant withdrawal symptoms. Denies current SI/HI/AVH. Patient is agreeable to return to [facility] at discharge, agency to provide transportation. Patient did inquire about getting transportation back to [place], advised patient this would be challenging and not guaranteed. Encouraged patient to return to [facility], complete their program, and return back home. Patient agreeable at this time.     PLAN:   Patient will continue stabilization. Patient will continue to receive services offered by Treatment Team.     Patient to return to [facility] at discharge.

## 2024-05-02 NOTE — PROGRESS NOTES
"INPATIENT PSYCHIATRIC PROGRESS NOTE    Name:  Carlos Peres  :  1990  MRN:  [id number]  Visit Number:  [id number]  Length of stay:  2    SUBJECTIVE    CC/Focus of Exam: alcohol use    INTERVAL HISTORY:  The patient reports he is feeling better.   Depression rating 3/10  Anxiety rating 3/10  Sleep: good  Withdrawal sx: None today  Cravin/10    Review of Systems   Constitutional: Negative.    Respiratory: Negative.     Cardiovascular: Negative.    Gastrointestinal: Negative.        OBJECTIVE    Temp:  [97.4 °F (36.3 °C)-97.9 °F (36.6 °C)] 97.9 °F (36.6 °C)  Heart Rate:  [] 85  Resp:  [16-20] 20  BP: (116-127)/(75-96) 116/75    MENTAL STATUS EXAM:  Appearance:Casually dressed, good hygeine.   Cooperation:Cooperative  Psychomotor: No psychomotor agitation/retardation, No EPS, No motor tics  Speech-normal rate, amount.  Mood \"better\"   Affect-congruent, appropriate, stable  Thought Content-goal directed, no delusional material present  Thought process-linear, organized.  Suicidality: No SI  Homicidality: No HI  Perception: No AH/VH  Insight-fair   Judgement-fair    Lab Results (last 24 hours)       ** No results found for the last 24 hours. **               Imaging Results (Last 24 Hours)       ** No results found for the last 24 hours. **               ECG/EMG Results (most recent)       Procedure Component Value Units Date/Time    ECG 12 Lead Other; Baseline Cardiac Status [[id number]] Collected: 24 1723     Updated: 24 0911     QT Interval 394 ms      QTC Interval 460 ms     Narrative:      Test Reason : Other~  Blood Pressure :   */*   mmHG  Vent. Rate :  82 BPM     Atrial Rate :  82 BPM     P-R Int : 148 ms          QRS Dur : 100 ms      QT Int : 394 ms       P-R-T Axes :  63  84  70 degrees     QTc Int : 460 ms    Normal sinus rhythm  Normal ECG  No previous ECGs available  Confirmed by Stephanie Sterling ([id number]) on 2024 9:11:11 AM    Referred By: RIYA           Confirmed By: " Stephanie Sterling             ALLERGIES: Patient has no known allergies.    Medication Review:   Scheduled Medications:  B-complex with vitamin C, 2 tablet, Oral, Daily  LORazepam, 1.5 mg, Oral, 3 times per day   Followed by  [START ON 5/3/2024] LORazepam, 1 mg, Oral, 3 times per day   Followed by  [START ON 2024] LORazepam, 0.5 mg, Oral, 3 times per day  multivitamin with minerals, 1 tablet, Oral, Daily  naltrexone, 50 mg, Oral, Daily  thiamine, 100 mg, Oral, Daily         PRN Medications:    acetaminophen    aluminum-magnesium hydroxide-simethicone    benzonatate    benztropine **OR** benztropine    famotidine    hydrOXYzine    ibuprofen    loperamide    [] LORazepam **FOLLOWED BY** LORazepam **FOLLOWED BY** [START ON 5/3/2024] LORazepam **FOLLOWED BY** [START ON 2024] LORazepam    magnesium hydroxide    ondansetron ODT    sodium chloride    traZODone   All medications reviewed.    ASSESSMENT & PLAN:      Alcohol use disorder, severe, dependence  -Continue Ativan detox  -Thiamine and folate    Special precautions: Special Precautions Level 4 (q30 min checks).    Behavioral Health Treatment Plan and Problem List: I have reviewed and approved the Behavioral Health Treatment Plan and Problem list.  The patient has had a chance to review and agrees with the treatment plan.    Copied text in portions of this note has been reviewed and is accurate as of 24         Clinician:  Swapnil Awan MD  24  14:56 EDT

## 2024-05-02 NOTE — PLAN OF CARE
"Goal Outcome Evaluation:  Plan of Care Reviewed With: patient  Patient Agreement with Plan of Care: agrees     Progress: improving  Outcome Evaluation: Pt has been calm and cooperative this shift. Pt rates anxiety 3/10 and depression  4/10. Pt denies SI/HI/AVH. Pt reports sleep and appetite \"are slowly improving.\" Pt has had no complaints this shift.                               "

## 2024-05-02 NOTE — PLAN OF CARE
Goal Outcome Evaluation:  Plan of Care Reviewed With: patient  Patient Agreement with Plan of Care: agrees     Progress: improving       Pt appetite is fair for shift and participated in group. Pt rates anxiety 3/10, depression 4/10, and denies cravings. Pt given Trazodone prn medication for sleep

## 2024-05-03 PROCEDURE — 63710000001 ONDANSETRON ODT 4 MG TABLET DISPERSIBLE: Performed by: PSYCHIATRY & NEUROLOGY

## 2024-05-03 PROCEDURE — 99232 SBSQ HOSP IP/OBS MODERATE 35: CPT | Performed by: PSYCHIATRY & NEUROLOGY

## 2024-05-03 RX ADMIN — HYDROXYZINE HYDROCHLORIDE 50 MG: 50 TABLET, FILM COATED ORAL at 08:25

## 2024-05-03 RX ADMIN — ONDANSETRON 4 MG: 4 TABLET, ORALLY DISINTEGRATING ORAL at 08:24

## 2024-05-03 RX ADMIN — Medication 2 TABLET: at 08:24

## 2024-05-03 RX ADMIN — LORAZEPAM 1 MG: 1 TABLET ORAL at 14:33

## 2024-05-03 RX ADMIN — HYDROXYZINE HYDROCHLORIDE 50 MG: 50 TABLET, FILM COATED ORAL at 17:43

## 2024-05-03 RX ADMIN — NALTREXONE HYDROCHLORIDE 50 MG: 50 TABLET, FILM COATED ORAL at 08:25

## 2024-05-03 RX ADMIN — LORAZEPAM 1 MG: 1 TABLET ORAL at 21:22

## 2024-05-03 RX ADMIN — TRAZODONE HYDROCHLORIDE 50 MG: 50 TABLET ORAL at 21:22

## 2024-05-03 RX ADMIN — LORAZEPAM 1 MG: 1 TABLET ORAL at 08:25

## 2024-05-03 RX ADMIN — Medication 1 TABLET: at 08:24

## 2024-05-03 RX ADMIN — Medication 100 MG: at 08:25

## 2024-05-03 NOTE — PROGRESS NOTES
"INPATIENT PSYCHIATRIC PROGRESS NOTE    Name:  Carlos Peres  :  1990  MRN:  [id number]  Visit Number:  [id number]  Length of stay:  3    SUBJECTIVE    CC/Focus of Exam: alcohol use    INTERVAL HISTORY:  The patient reports he is feeling better and is not experiencing any active withdrawals.   Depression rating 3/10  Anxiety rating 3/10  Sleep: good  Withdrawal sx: None today  Cravin/10    Review of Systems   Constitutional: Negative.    Respiratory: Negative.     Cardiovascular: Negative.    Gastrointestinal: Negative.        OBJECTIVE    Temp:  [97.3 °F (36.3 °C)-97.9 °F (36.6 °C)] 97.3 °F (36.3 °C)  Heart Rate:  [76-90] 76  Resp:  [16-20] 18  BP: (106-138)/(62-76) 106/62    MENTAL STATUS EXAM:  Appearance:Casually dressed, good hygeine.   Cooperation:Cooperative  Psychomotor: No psychomotor agitation/retardation, No EPS, No motor tics  Speech-normal rate, amount.  Mood \"better\"   Affect-congruent, appropriate, stable  Thought Content-goal directed, no delusional material present  Thought process-linear, organized.  Suicidality: No SI  Homicidality: No HI  Perception: No AH/VH  Insight-fair   Judgement-fair    Lab Results (last 24 hours)       ** No results found for the last 24 hours. **               Imaging Results (Last 24 Hours)       ** No results found for the last 24 hours. **               ECG/EMG Results (most recent)       Procedure Component Value Units Date/Time    ECG 12 Lead Other; Baseline Cardiac Status [[id number]] Collected: 24 1723     Updated: 24 0911     QT Interval 394 ms      QTC Interval 460 ms     Narrative:      Test Reason : Other~  Blood Pressure :   */*   mmHG  Vent. Rate :  82 BPM     Atrial Rate :  82 BPM     P-R Int : 148 ms          QRS Dur : 100 ms      QT Int : 394 ms       P-R-T Axes :  63  84  70 degrees     QTc Int : 460 ms    Normal sinus rhythm  Normal ECG  No previous ECGs available  Confirmed by Stephanie Sterling ([id number]) on 2024 9:11:11 " AM    Referred By: RIYA           Confirmed By: Stephanie Sterling             ALLERGIES: Patient has no known allergies.    Medication Review:   Scheduled Medications:  B-complex with vitamin C, 2 tablet, Oral, Daily  LORazepam, 1 mg, Oral, 3 times per day   Followed by  [START ON 2024] LORazepam, 0.5 mg, Oral, 3 times per day  multivitamin with minerals, 1 tablet, Oral, Daily  naltrexone, 50 mg, Oral, Daily  thiamine, 100 mg, Oral, Daily         PRN Medications:    acetaminophen    aluminum-magnesium hydroxide-simethicone    benzonatate    benztropine **OR** benztropine    famotidine    hydrOXYzine    ibuprofen    loperamide    [] LORazepam **FOLLOWED BY** [] LORazepam **FOLLOWED BY** LORazepam **FOLLOWED BY** [START ON 2024] LORazepam    magnesium hydroxide    ondansetron ODT    sodium chloride    traZODone   All medications reviewed.    ASSESSMENT & PLAN:      Alcohol use disorder, severe, dependence  -Continue Ativan detox  -Thiamine and folate    Special precautions: Special Precautions Level 4 (q30 min checks).    Behavioral Health Treatment Plan and Problem List: I have reviewed and approved the Behavioral Health Treatment Plan and Problem list.  The patient has had a chance to review and agrees with the treatment plan.    Copied text in portions of this note has been reviewed and is accurate as of 24         Clinician:  Swapnil Awan MD  24  11:51 EDT

## 2024-05-03 NOTE — PLAN OF CARE
Goal Outcome Evaluation:        Problem: Adult Behavioral Health Plan of Care  Goal: Patient-Specific Goal (Individualization)  Outcome: Ongoing, Progressing  Flowsheets  Taken 5/1/2024 1020 by Natalie Gama CSW  Patient-Specific Goals (Include Timeframe): Identify 2-3 coping skills, address relapse prevention methods, complete aftercare plans and deny SI/HI prior to discharge.  Individualized Care Needs: Therapist to offer 1-4 therapy sessions, aftercare planning, safety planning, family education, group therapy, and brief CBT/MI interventions.  Taken 5/1/2024 1015 by Natalie Gama CSW  Patient Personal Strengths:   resilient   resourceful   motivated for treatment   motivated for recovery   family/social support   positive educational history   stable living environment   independent living skills  Patient Vulnerabilities:   substance abuse/addiction   history of unsuccessful treatment   poor impulse control  Taken 4/30/2024 1342 by Claire Molina RN  Anxieties, Fears or Concerns: None verbalized  Goal: Optimized Coping Skills in Response to Life Stressors  Outcome: Ongoing, Progressing  Flowsheets (Taken 5/1/2024 1020)  Optimized Coping Skills in Response to Life Stressors: making progress toward outcome  Intervention: Promote Effective Coping Strategies  Flowsheets (Taken 5/3/2024 1036)  Supportive Measures:   active listening utilized   counseling provided   decision-making supported   goal-setting facilitated   verbalization of feelings encouraged   self-responsibility promoted   self-reflection promoted   positive reinforcement provided   self-care encouraged  Goal: Develops/Participates in Therapeutic Central to Support Successful Transition  Outcome: Ongoing, Progressing  Flowsheets (Taken 5/1/2024 1020)  Develops/Participates in Therapeutic Central to Support Successful Transition: making progress toward outcome  Intervention: Foster Therapeutic Central  Flowsheets (Taken 5/3/2024 0800  by Ames, Nanda, RN)  Trust Relationship/Rapport:   care explained   choices provided   emotional support provided   empathic listening provided   questions answered   questions encouraged   reassurance provided   thoughts/feelings acknowledged  Intervention: Mutually Develop Transition Plan  Flowsheets  Taken 5/3/2024 1035  Discharge Coordination/Progress: Patient has Passport Medicaid. Patient to return to [facility] at discharge, Agency to provide transportation.  Transportation Anticipated: agency  Transportation Concerns: none  Current Discharge Risk: substance use/abuse  Concerns to be Addressed:   substance/tobacco abuse/use   coping/stress   cognitive/perceptual   mental health   discharge planning  Readmission Within the Last 30 Days: no previous admission in last 30 days  Patient/Family Anticipated Services at Transition: rehabilitation services  Patient's Choice of Community Agency(s): [facility]  Patient/Family Anticipates Transition to: inpatient rehabilitation facility  Offered/Gave Vendor List: no  Taken 5/1/2024 1020  Outpatient/Agency/Support Group Needs: residential services  Transition Support:   crisis management plan promoted   follow-up care discussed   follow-up care coordinated   community resources reviewed   crisis management plan verbalized  Anticipated Discharge Disposition: residential substance use unit         DATA:  Therapist met with Patient individually this date. Patient agreeable to discuss current treatment progress and discharge concerns.     CLINICAL MANUVERING/INTERVENTIONS:    Assisted Patient in processing session content; acknowledged and normalized Patient’s thoughts, feelings, and concerns by utilizing a person-centered approach in efforts to build appropriate rapport and a positive therapeutic relationship with open and honest communication. Allowed Patient to ventilate regarding current stressors and triggers for negative emotions and thoughts in a safe  nonjudgmental environment with unconditional positive regard, active listening skills, and empathy. Therapist implemented motivational interviewing techniques to assist Patient with exploring personal growth and change and discussed distress tolerance skills, self soothing techniques, and applied cognitive behavioral strategies to facilitate identification of maladaptive patterns of thinking and behavior.Therapist utilized dialectical behavior techniques to teach and model emotional regulation and relaxation methods. Therapist assisted Patient with identifying and implementing healthier coping strategies. Therapist assisted Patient with safety planning; Patient agreed to continue honest communication with Treatment Team while inpatient and identify any SI/HI. Patient encouraged to seek nearest ER or contact 911 if danger to self or others post discharge.     ASSESSMENT:  Therapist met with patient on this date, he continues to receive treatment for alcohol detox. He reports moderate anxiety and depression, denies current SI/HI/AVH. Patient plans to return to [facility] at discharge, agency to provide transportation. He denies having any additional needs or concerns at this time.     PLAN:   Patient will continue stabilization. Patient will continue to receive services offered by Treatment Team.     Patient to return to [facility] at discharge.

## 2024-05-03 NOTE — PLAN OF CARE
Goal Outcome Evaluation:  Plan of Care Reviewed With: patient  Patient Agreement with Plan of Care: agrees     Progress: improving  Outcome Evaluation: Patient has been calm and cooperative this shift. Reports good sleep and appetite. Rates his anxiety and depression both a 3/10. Patient denies any SI/HI/AVH. Denies any pain and rates craving a 1/10. Patient voices no complaints this shift.

## 2024-05-03 NOTE — PLAN OF CARE
Goal Outcome Evaluation:  Plan of Care Reviewed With: patient  Patient Agreement with Plan of Care: agrees     Progress: improving     Pt calm and cooperative for shift. Appetite is good for shift. Rates anxiety 5/10, depression 5/10, and denies cravings. Denies SI/HI/AVH. Given Trazodone and Atarax prn medications.

## 2024-05-04 PROCEDURE — 99232 SBSQ HOSP IP/OBS MODERATE 35: CPT | Performed by: PSYCHIATRY & NEUROLOGY

## 2024-05-04 RX ADMIN — TRAZODONE HYDROCHLORIDE 50 MG: 50 TABLET ORAL at 23:36

## 2024-05-04 RX ADMIN — Medication 100 MG: at 08:15

## 2024-05-04 RX ADMIN — LORAZEPAM 0.5 MG: 0.5 TABLET ORAL at 21:16

## 2024-05-04 RX ADMIN — Medication 1 TABLET: at 08:16

## 2024-05-04 RX ADMIN — HYDROXYZINE HYDROCHLORIDE 50 MG: 50 TABLET, FILM COATED ORAL at 20:20

## 2024-05-04 RX ADMIN — LORAZEPAM 0.5 MG: 0.5 TABLET ORAL at 08:16

## 2024-05-04 RX ADMIN — LORAZEPAM 0.5 MG: 0.5 TABLET ORAL at 14:46

## 2024-05-04 RX ADMIN — Medication 2 TABLET: at 08:15

## 2024-05-04 RX ADMIN — NALTREXONE HYDROCHLORIDE 50 MG: 50 TABLET, FILM COATED ORAL at 08:16

## 2024-05-04 RX ADMIN — HYDROXYZINE HYDROCHLORIDE 50 MG: 50 TABLET, FILM COATED ORAL at 08:15

## 2024-05-04 NOTE — PLAN OF CARE
Goal Outcome Evaluation:  Plan of Care Reviewed With: patient  Patient Agreement with Plan of Care: agrees     Progress: improving  Outcome Evaluation: Pt calm and cooperative this shift. Reports minimal anxiety and slight tremor felt. Denies SI/HI/AVH. No distress noted.

## 2024-05-04 NOTE — PLAN OF CARE
Goal Outcome Evaluation:  Plan of Care Reviewed With: patient  Patient Agreement with Plan of Care: agrees     Progress: improving       Pt slept well and appetite is good. Patient calm and cooperative for shift. Rates anxiety 3/10, depression 4/10, and cravings 1/10. Denies SI/HI/AVH. Pt given Trazodone prn medication for sleep.

## 2024-05-04 NOTE — PROGRESS NOTES
"INPATIENT PSYCHIATRIC PROGRESS NOTE    Name:  Carlos Peres  :  1990  MRN:  [id number]  Visit Number:  [id number]  Length of stay:  4    SUBJECTIVE  CC/Focus of Exam: Alcohol use    INTERVAL HISTORY:  Patient is seen for follow up, patient reported he slept well, ate good, his detox is going smoothly, he stated having the plan to go back to [facility] after the detox, he states Naltrexone helps him well with cravings.  Depression rating 3/10  Anxiety rating 1/10  Sleep: good  Withdrawal sx: slightly tremulous  Cravin/10    Review of Systems   Neurological:  Positive for tremors.   Psychiatric/Behavioral:  Positive for dysphoric mood. The patient is nervous/anxious.    All other systems reviewed and are negative.      OBJECTIVE    Temp:  [96.9 °F (36.1 °C)-98.2 °F (36.8 °C)] 97.9 °F (36.6 °C)  Heart Rate:  [56-76] 62  Resp:  [16-18] 18  BP: (113-121)/(60-83) 116/74    MENTAL STATUS EXAM:  Appearance: Casually dressed, good hygeine.   Cooperation: Cooperative  Psychomotor: No psychomotor agitation/retardation, No EPS, No motor tics  Speech: normal rate, amount.  Mood: \"good\"   Affect: congruent, appropriate  Thought Content: goal directed, no delusional material present  Thought process: linear, organized.  Suicidality: No SI  Homicidality: No HI  Perception: No AH/VH  Insight: fair   Judgment: fair    Lab Results (last 24 hours)       ** No results found for the last 24 hours. **               Imaging Results (Last 24 Hours)       ** No results found for the last 24 hours. **               ECG/EMG Results (most recent)       Procedure Component Value Units Date/Time    ECG 12 Lead Other; Baseline Cardiac Status [[id number]] Collected: 24 1723     Updated: 24 0911     QT Interval 394 ms      QTC Interval 460 ms     Narrative:      Test Reason : Other~  Blood Pressure :   */*   mmHG  Vent. Rate :  82 BPM     Atrial Rate :  82 BPM     P-R Int : 148 ms          QRS Dur : 100 ms      " QT Int : 394 ms       P-R-T Axes :  63  84  70 degrees     QTc Int : 460 ms    Normal sinus rhythm  Normal ECG  No previous ECGs available  Confirmed by Stephanie Sterling () on 2024 9:11:11 AM    Referred By: RIYA           Confirmed By: Stephanie Sterling             ALLERGIES: Patient has no known allergies.      Current Facility-Administered Medications:     acetaminophen (TYLENOL) tablet 650 mg, 650 mg, Oral, Q6H PRN, Joo Daniels MD    aluminum-magnesium hydroxide-simethicone (MAALOX MAX) 400-400-40 MG/5ML suspension 15 mL, 15 mL, Oral, Q6H PRN, Joo Daniels MD, 15 mL at 24 1357    B-complex with vitamin C tablet 2 tablet, 2 tablet, Oral, Daily, Joo Daniels MD, 2 tablet at 24 0815    benzonatate (TESSALON) capsule 100 mg, 100 mg, Oral, TID PRN, Joo Daniels MD    benztropine (COGENTIN) tablet 2 mg, 2 mg, Oral, Once PRN **OR** benztropine (COGENTIN) injection 1 mg, 1 mg, Intramuscular, Once PRN, Joo Daniels MD    famotidine (PEPCID) tablet 20 mg, 20 mg, Oral, BID PRN, Joo Daniels MD    hydrOXYzine (ATARAX) tablet 50 mg, 50 mg, Oral, Q6H PRN, Joo Daniels MD, 50 mg at 24 0815    ibuprofen (ADVIL,MOTRIN) tablet 400 mg, 400 mg, Oral, Q6H PRN, Joo Daniels MD, 400 mg at 24 0828    loperamide (IMODIUM) capsule 2 mg, 2 mg, Oral, Q2H PRN, Joo Daniels MD    [] LORazepam (ATIVAN) tablet 2 mg, 2 mg, Oral, 3 times per day, 2 mg at 24 **FOLLOWED BY** [COMPLETED] LORazepam (ATIVAN) tablet 1.5 mg, 1.5 mg, Oral, 3 times per day, 1.5 mg at 24 **FOLLOWED BY** [COMPLETED] LORazepam (ATIVAN) tablet 1 mg, 1 mg, Oral, 3 times per day, 1 mg at 24 **FOLLOWED BY** LORazepam (ATIVAN) tablet 0.5 mg, 0.5 mg, Oral, 3 times per day, Joo Daniels MD, 0.5 mg at 24 0816    [] LORazepam (ATIVAN) tablet 2 mg, 2 mg, Oral, Q4H PRN **FOLLOWED BY** [] LORazepam (ATIVAN) tablet 1.5 mg,  1.5 mg, Oral, Q4H PRN **FOLLOWED BY** [] LORazepam (ATIVAN) tablet 1 mg, 1 mg, Oral, Q4H PRN **FOLLOWED BY** LORazepam (ATIVAN) tablet 0.5 mg, 0.5 mg, Oral, Q4H PRN, Joo Daniels MD    magnesium hydroxide (MILK OF MAGNESIA) suspension 10 mL, 10 mL, Oral, Daily PRN, Joo Daniels MD    multivitamin with minerals 1 tablet, 1 tablet, Oral, Daily, Joo Daniels MD, 1 tablet at 24 0816    naltrexone (DEPADE) tablet 50 mg, 50 mg, Oral, Daily, Swapnil Awan MD, 50 mg at 24 0816    ondansetron ODT (ZOFRAN-ODT) disintegrating tablet 4 mg, 4 mg, Translingual, Q6H PRN, Joo Daniels MD, 4 mg at 24 0824    sodium chloride nasal spray 2 spray, 2 spray, Each Nare, PRN, Joo Daniels MD    thiamine (VITAMIN B-1) tablet 100 mg, 100 mg, Oral, Daily, Joo Daniels MD, 100 mg at 24 0815    traZODone (DESYREL) tablet 50 mg, 50 mg, Oral, Nightly PRN, Joo Daniels MD, 50 mg at 242    Reviewed chart, notes, vitals, labs and EKG personally    ASSESSMENT & PLAN:        Alcohol use disorder, severe, dependence  Continue Ativan detox  Thiamine and multivitamin, vitamin b complex  Naltrexone 50 MG PO Q DAILY FOR ALCOHOL USE DISORDER AND Cravings    Special precautions: Special Precautions Level 4 (q30 min checks)    Behavioral Health Treatment Plan and Problem List: I have reviewed and approved the Behavioral Health Treatment Plan and Problem list.  The patient has had a chance to review and agrees with the treatment plan.     Clinician:  Aristeo Velazquez MD  24  11:04 EDT

## 2024-05-05 VITALS
DIASTOLIC BLOOD PRESSURE: 79 MMHG | SYSTOLIC BLOOD PRESSURE: 109 MMHG | BODY MASS INDEX: 18.07 KG/M2 | WEIGHT: 119.2 LBS | HEART RATE: 76 BPM | TEMPERATURE: 97.9 F | RESPIRATION RATE: 18 BRPM | HEIGHT: 68 IN | OXYGEN SATURATION: 98 %

## 2024-05-05 PROCEDURE — 99239 HOSP IP/OBS DSCHRG MGMT >30: CPT | Performed by: PSYCHIATRY & NEUROLOGY

## 2024-05-05 RX ADMIN — NALTREXONE HYDROCHLORIDE 50 MG: 50 TABLET, FILM COATED ORAL at 08:02

## 2024-05-05 RX ADMIN — Medication 2 TABLET: at 08:02

## 2024-05-05 RX ADMIN — HYDROXYZINE HYDROCHLORIDE 50 MG: 50 TABLET, FILM COATED ORAL at 08:02

## 2024-05-05 RX ADMIN — Medication 100 MG: at 08:02

## 2024-05-05 RX ADMIN — Medication 1 TABLET: at 08:02

## 2024-05-05 NOTE — PLAN OF CARE
Goal Outcome Evaluation:  Plan of Care Reviewed With: patient  Patient Agreement with Plan of Care: agrees     Progress: improving  Outcome Evaluation: pt calm and cooperative this shift. reports mild anxiety. denies SI/HI/AVH. no distress noted. a/o x 4. CIWA 1.  Pt slept 5 hours or more this shift.

## 2024-05-05 NOTE — PLAN OF CARE
Goal Outcome Evaluation:  Plan of Care Reviewed With: patient  Patient Agreement with Plan of Care: agrees     Progress: improving

## 2024-05-05 NOTE — DISCHARGE SUMMARY
"PSYCHIATRIC DISCHARGE SUMMARY     Patient Identification:  Name:  Carlos Peres  Age:  33 y.o.  Sex:  male  :  1990  MRN:  [id number]  Visit Number:  [id number]    Date of Admission:2024   Date of Discharge:  2024      Discharge Diagnosis:  Principal Problem:    Alcohol use disorder, severe, dependence      Admission Diagnosis:  EtOH dependence [F10.20]     Hospital Course  Patient is a 33 y.o. male presented with complaints of alcohol use and withdrawals, patient reports a long history of substance use started at the age of 13, drinking regularly in his early 20s has been worsening lately, he was drinking 10-15 drinks daily prior to coming here, withdrawal symptoms include tremors, nausea, insomnia and he was endorsing symptoms of withdrawal send tolerance and ongoing cravings to use, tried to cut down but was unsuccessful.  Spends too much time on resources in pursuit of substance use.  Withdrawal symptoms include chills, diaphoresis, fatigue, nausea, myalgias, tremors, nervous and anxious.  Patient was admitted to detox unit and was started on Ativan detox with thiamine vitamin B complex and multivitamin.  He was seen by therapist individually and therapist worked with him in regards to coordinating his aftercare.  Patient tolerated withdrawals with Ativan detox did not have any seizures and did not have any delirium.  Patient was accepted to [facility] and they are going to come and pick him up today.    On the day of discharge, patient denied SI, HI or AVH. Patient was stable and appropriate by the conclusion of this admission, denying significant symptoms of mood, psychotic or thought disorder. Patient showed improvement of presenting symptoms and was deemed appropriate for discharge today.    Mental Status Exam upon discharge:   Mood \"OK\"   Affect-congruent, appropriate, stable  Thought Content-goal directed, no delusional material present  Thought " process-linear, organized.  Suicidality: No SI  Homicidality: No HI  Perception: No AH/VH    Procedures Performed         Consults:   Consults       No orders found from 4/1/2024 to 5/1/2024.            Pertinent Test Results:   Lab Results (last 7 days)       Procedure Component Value Units Date/Time    Hepatitis Panel, Acute [[id number]]  (Normal) Collected: 04/30/24 1540    Specimen: Blood Updated: 04/30/24 2757     Hepatitis B Surface Ag Non-Reactive     Hep A IgM Non-Reactive     Hep B C IgM Non-Reactive     Hepatitis C Ab Non-Reactive    Narrative:      Results may be falsely decreased if patient taking Biotin.         Ethanol plasma on 4/30/2024 is less than 10 mg/dL, magnesium is 1.8, WBC is 13.58, hemoglobin 17.4 urinalysis showed trace leukocyte esterase and nitrite negative, urine drug screen is negative fentanyl urine is negative, electrolytes were within normal limits, hepatitis panel was negative    Condition on Discharge:  stable    Vital Signs  Temp:  [97 °F (36.1 °C)-98.1 °F (36.7 °C)] 97.9 °F (36.6 °C)  Heart Rate:  [58-82] 76  Resp:  [16-18] 18  BP: ()/(52-79) 109/79    Discharge Disposition:  Home or Self Care    Discharge Medications:     Discharge Medications        Continue These Medications        Instructions Start Date   hydrOXYzine 10 MG tablet  Commonly known as: ATARAX   10 mg, Oral, 3 Times Daily PRN      naltrexone 50 MG tablet  Commonly known as: DEPADE   50 mg, Oral, Daily               Discharge Diet: Normal    Activity at Discharge: Normal    Follow-up Appointments  [facility]       Test Results Pending at Discharge  None     Time: I spent  32  minutes on this discharge activity which included: face-to-face encounter with the patient, reviewing the data in the system, coordination of the care with the nursing staff as well as consultants, documentation, and entering orders.      Clinician:   Aristeo Velazquez MD  05/05/24  11:28 EDT

## 2024-05-06 NOTE — PAYOR COMM NOTE
"Carlos Peres (33 y.o. Male)       Date of Birth   1990    Social Security Number       Address   [address] [place] [address]    Home Phone   [phone]    MRN   [id number]       Lutheran   None    Marital Status   Single                            Admission Date   24    Admission Type   Emergency    Admitting Provider   Joo Daniels MD    Attending Provider       Department, Room/Bed   [facility] ADULT CD, 1042/2S       Discharge Date   2024    Discharge Disposition   Home or Self Care    Discharge Destination                                 Attending Provider: (none)   Allergies: No Known Allergies    Isolation: None   Infection: None   Code Status: Prior    Ht: 172.7 cm (68\")   Wt: 54.1 kg (119 lb 3.2 oz)    Admission Cmt: None   Principal Problem: Alcohol use disorder, severe, dependence [F10.20]                   Active Insurance as of 2024       Primary Coverage       Payor Plan Insurance Group Employer/Plan Group    [facility] BY [employer]EL [id number]       Payor Plan Address Payor Plan Phone Number Payor Plan Fax Number Effective Dates    [address] [address]   2022 - None Entered    [facility] [address]         Subscriber Name Subscriber Birth Date Member ID       CARLOS PERES 1990 [id number]                     Emergency Contacts        (Rel.) Home Phone Work Phone Mobile Phone    LILLIAM PERES (Mother) [phone] -- --                 Discharge Summary        Aristeo Velazquez MD at 24 1128          PSYCHIATRIC DISCHARGE SUMMARY     Patient Identification:  Name:  Carlos Peres  Age:  33 y.o.  Sex:  male  :  1990  MRN:  [id number]  Visit Number:  [id number]    Date of Admission:2024   Date of Discharge:  2024      Discharge Diagnosis:  Principal Problem:    Alcohol use disorder, severe, dependence      Admission Diagnosis:  EtOH dependence [F10.20]     [facility]" "Course  Patient is a 33 y.o. male presented with complaints of alcohol use and withdrawals, patient reports a long history of substance use started at the age of 13, drinking regularly in his early 20s has been worsening lately, he was drinking 10-15 drinks daily prior to coming here, withdrawal symptoms include tremors, nausea, insomnia and he was endorsing symptoms of withdrawal send tolerance and ongoing cravings to use, tried to cut down but was unsuccessful.  Spends too much time on resources in pursuit of substance use.  Withdrawal symptoms include chills, diaphoresis, fatigue, nausea, myalgias, tremors, nervous and anxious.  Patient was admitted to detox unit and was started on Ativan detox with thiamine vitamin B complex and multivitamin.  He was seen by therapist individually and therapist worked with him in regards to coordinating his aftercare.  Patient tolerated withdrawals with Ativan detox did not have any seizures and did not have any delirium.  Patient was accepted to [facility] and they are going to come and pick him up today.    On the day of discharge, patient denied SI, HI or AVH. Patient was stable and appropriate by the conclusion of this admission, denying significant symptoms of mood, psychotic or thought disorder. Patient showed improvement of presenting symptoms and was deemed appropriate for discharge today.    Mental Status Exam upon discharge:   Mood \"OK\"   Affect-congruent, appropriate, stable  Thought Content-goal directed, no delusional material present  Thought process-linear, organized.  Suicidality: No SI  Homicidality: No HI  Perception: No AH/VH    Procedures Performed         Consults:   Consults       No orders found from 4/1/2024 to 5/1/2024.            Pertinent Test Results:   Lab Results (last 7 days)       Procedure Component Value Units Date/Time    Hepatitis Panel, Acute [[id number]]  (Normal) Collected: 04/30/24 1540    Specimen: Blood " Updated: 04/30/24 1723     Hepatitis B Surface Ag Non-Reactive     Hep A IgM Non-Reactive     Hep B C IgM Non-Reactive     Hepatitis C Ab Non-Reactive    Narrative:      Results may be falsely decreased if patient taking Biotin.         Ethanol plasma on 4/30/2024 is less than 10 mg/dL, magnesium is 1.8, WBC is 13.58, hemoglobin 17.4 urinalysis showed trace leukocyte esterase and nitrite negative, urine drug screen is negative fentanyl urine is negative, electrolytes were within normal limits, hepatitis panel was negative    Condition on Discharge:  stable    Vital Signs  Temp:  [97 °F (36.1 °C)-98.1 °F (36.7 °C)] 97.9 °F (36.6 °C)  Heart Rate:  [58-82] 76  Resp:  [16-18] 18  BP: ()/(52-79) 109/79    Discharge Disposition:  Home or Self Care    Discharge Medications:     Discharge Medications        Continue These Medications        Instructions Start Date   hydrOXYzine 10 MG tablet  Commonly known as: ATARAX   10 mg, Oral, 3 Times Daily PRN      naltrexone 50 MG tablet  Commonly known as: DEPADE   50 mg, Oral, Daily               Discharge Diet: Normal    Activity at Discharge: Normal    Follow-up Appointments  [facility]       Test Results Pending at Discharge  None     Time: I spent  32  minutes on this discharge activity which included: face-to-face encounter with the patient, reviewing the data in the system, coordination of the care with the nursing staff as well as consultants, documentation, and entering orders.      Clinician:   Aristeo Velazquez MD  05/05/24  11:28 EDT     Electronically signed by Aristeo Velazquez MD at 05/05/24 1214     [address]  [phone]    Return at discharge.